# Patient Record
Sex: FEMALE | Race: WHITE | Employment: UNEMPLOYED | URBAN - METROPOLITAN AREA
[De-identification: names, ages, dates, MRNs, and addresses within clinical notes are randomized per-mention and may not be internally consistent; named-entity substitution may affect disease eponyms.]

---

## 2019-04-05 PROBLEM — R49.0 HOARSENESS OF VOICE: Status: ACTIVE | Noted: 2019-04-05

## 2019-04-05 PROBLEM — J45.30 MILD PERSISTENT ASTHMA: Status: ACTIVE | Noted: 2019-04-05

## 2020-02-13 PROBLEM — R49.0 HOARSENESS OF VOICE: Status: RESOLVED | Noted: 2019-04-05 | Resolved: 2020-02-13

## 2020-02-13 PROBLEM — J43.2 CENTRILOBULAR EMPHYSEMA (HCC): Status: ACTIVE | Noted: 2020-02-13

## 2020-06-17 PROBLEM — M75.82 ROTATOR CUFF TENDINITIS, LEFT: Status: ACTIVE | Noted: 2020-06-17

## 2020-06-17 PROBLEM — M75.81 ROTATOR CUFF TENDINITIS, RIGHT: Status: ACTIVE | Noted: 2020-06-17

## 2020-12-10 PROBLEM — J45.20 MILD INTERMITTENT ASTHMA: Status: ACTIVE | Noted: 2019-04-05

## 2022-06-13 PROBLEM — N18.30 CHRONIC RENAL DISEASE, STAGE III (HCC): Status: ACTIVE | Noted: 2022-06-13

## 2022-09-29 RX ORDER — SIMVASTATIN 40 MG
TABLET ORAL
Qty: 90 TABLET | Refills: 1 | Status: SHIPPED | OUTPATIENT
Start: 2022-09-29 | End: 2022-11-03 | Stop reason: SDUPTHER

## 2022-09-29 RX ORDER — PROPRANOLOL HYDROCHLORIDE 20 MG/1
TABLET ORAL
Qty: 180 TABLET | Refills: 1 | Status: SHIPPED | OUTPATIENT
Start: 2022-09-29

## 2022-10-04 DIAGNOSIS — E11.9 TYPE 2 DIABETES MELLITUS WITHOUT COMPLICATION, WITHOUT LONG-TERM CURRENT USE OF INSULIN (HCC): Chronic | ICD-10-CM

## 2022-10-04 LAB
A/G RATIO: 1.4 (ref 1.1–2.2)
ALBUMIN SERPL-MCNC: 4 G/DL (ref 3.4–5)
ALP BLD-CCNC: 91 U/L (ref 40–129)
ALT SERPL-CCNC: 17 U/L (ref 10–40)
ANION GAP SERPL CALCULATED.3IONS-SCNC: 16 MMOL/L (ref 3–16)
AST SERPL-CCNC: 13 U/L (ref 15–37)
BILIRUB SERPL-MCNC: 0.3 MG/DL (ref 0–1)
BUN BLDV-MCNC: 20 MG/DL (ref 7–20)
CALCIUM SERPL-MCNC: 10 MG/DL (ref 8.3–10.6)
CHLORIDE BLD-SCNC: 99 MMOL/L (ref 99–110)
CHOLESTEROL, TOTAL: 189 MG/DL (ref 0–199)
CO2: 23 MMOL/L (ref 21–32)
CREAT SERPL-MCNC: 1 MG/DL (ref 0.6–1.2)
GFR AFRICAN AMERICAN: >60
GFR NON-AFRICAN AMERICAN: 56
GLUCOSE BLD-MCNC: 240 MG/DL (ref 70–99)
HDLC SERPL-MCNC: 40 MG/DL (ref 40–60)
LDL CHOLESTEROL CALCULATED: ABNORMAL MG/DL
LDL CHOLESTEROL DIRECT: 78 MG/DL
POTASSIUM SERPL-SCNC: 4.8 MMOL/L (ref 3.5–5.1)
SODIUM BLD-SCNC: 138 MMOL/L (ref 136–145)
TOTAL PROTEIN: 6.9 G/DL (ref 6.4–8.2)
TRIGL SERPL-MCNC: 503 MG/DL (ref 0–150)
VLDLC SERPL CALC-MCNC: ABNORMAL MG/DL

## 2022-10-05 LAB
ESTIMATED AVERAGE GLUCOSE: 205.9 MG/DL
HBA1C MFR BLD: 8.8 %

## 2022-10-19 DIAGNOSIS — R05.3 CHRONIC COUGH: ICD-10-CM

## 2022-10-20 RX ORDER — FAMOTIDINE 20 MG/1
TABLET, FILM COATED ORAL
Qty: 180 TABLET | Refills: 0 | Status: SHIPPED | OUTPATIENT
Start: 2022-10-20

## 2022-10-28 ENCOUNTER — HOSPITAL ENCOUNTER (OUTPATIENT)
Dept: WOMENS IMAGING | Age: 62
Discharge: HOME | End: 2022-10-28
Payer: MEDICARE

## 2022-10-28 ENCOUNTER — APPOINTMENT (OUTPATIENT)
Dept: ULTRASOUND IMAGING | Age: 62
End: 2022-10-28
Payer: MEDICARE

## 2022-10-28 DIAGNOSIS — R92.8 ABNORMAL MAMMOGRAM: ICD-10-CM

## 2022-10-28 PROCEDURE — 77065 DX MAMMO INCL CAD UNI: CPT

## 2022-10-31 ENCOUNTER — OFFICE VISIT (OUTPATIENT)
Dept: FAMILY MEDICINE CLINIC | Age: 62
End: 2022-10-31
Payer: MEDICARE

## 2022-10-31 VITALS
HEIGHT: 67 IN | DIASTOLIC BLOOD PRESSURE: 80 MMHG | TEMPERATURE: 97 F | SYSTOLIC BLOOD PRESSURE: 124 MMHG | WEIGHT: 224 LBS | HEART RATE: 72 BPM | BODY MASS INDEX: 35.16 KG/M2

## 2022-10-31 DIAGNOSIS — E11.9 TYPE 2 DIABETES MELLITUS WITHOUT COMPLICATION, WITHOUT LONG-TERM CURRENT USE OF INSULIN (HCC): Chronic | ICD-10-CM

## 2022-10-31 DIAGNOSIS — E78.2 MIXED HYPERLIPIDEMIA: Primary | Chronic | ICD-10-CM

## 2022-10-31 PROCEDURE — 1036F TOBACCO NON-USER: CPT | Performed by: FAMILY MEDICINE

## 2022-10-31 PROCEDURE — G8417 CALC BMI ABV UP PARAM F/U: HCPCS | Performed by: FAMILY MEDICINE

## 2022-10-31 PROCEDURE — 3017F COLORECTAL CA SCREEN DOC REV: CPT | Performed by: FAMILY MEDICINE

## 2022-10-31 PROCEDURE — G8427 DOCREV CUR MEDS BY ELIG CLIN: HCPCS | Performed by: FAMILY MEDICINE

## 2022-10-31 PROCEDURE — G8484 FLU IMMUNIZE NO ADMIN: HCPCS | Performed by: FAMILY MEDICINE

## 2022-10-31 PROCEDURE — 3052F HG A1C>EQUAL 8.0%<EQUAL 9.0%: CPT | Performed by: FAMILY MEDICINE

## 2022-10-31 PROCEDURE — 2022F DILAT RTA XM EVC RTNOPTHY: CPT | Performed by: FAMILY MEDICINE

## 2022-10-31 PROCEDURE — 99213 OFFICE O/P EST LOW 20 MIN: CPT | Performed by: FAMILY MEDICINE

## 2022-10-31 SDOH — ECONOMIC STABILITY: TRANSPORTATION INSECURITY
IN THE PAST 12 MONTHS, HAS LACK OF TRANSPORTATION KEPT YOU FROM MEETINGS, WORK, OR FROM GETTING THINGS NEEDED FOR DAILY LIVING?: NO

## 2022-10-31 SDOH — ECONOMIC STABILITY: FOOD INSECURITY: WITHIN THE PAST 12 MONTHS, THE FOOD YOU BOUGHT JUST DIDN'T LAST AND YOU DIDN'T HAVE MONEY TO GET MORE.: NEVER TRUE

## 2022-10-31 SDOH — ECONOMIC STABILITY: TRANSPORTATION INSECURITY
IN THE PAST 12 MONTHS, HAS THE LACK OF TRANSPORTATION KEPT YOU FROM MEDICAL APPOINTMENTS OR FROM GETTING MEDICATIONS?: NO

## 2022-10-31 SDOH — ECONOMIC STABILITY: FOOD INSECURITY: WITHIN THE PAST 12 MONTHS, YOU WORRIED THAT YOUR FOOD WOULD RUN OUT BEFORE YOU GOT MONEY TO BUY MORE.: NEVER TRUE

## 2022-10-31 ASSESSMENT — PATIENT HEALTH QUESTIONNAIRE - PHQ9
SUM OF ALL RESPONSES TO PHQ QUESTIONS 1-9: 0
2. FEELING DOWN, DEPRESSED OR HOPELESS: 0
1. LITTLE INTEREST OR PLEASURE IN DOING THINGS: 0
SUM OF ALL RESPONSES TO PHQ QUESTIONS 1-9: 0
SUM OF ALL RESPONSES TO PHQ QUESTIONS 1-9: 0
SUM OF ALL RESPONSES TO PHQ9 QUESTIONS 1 & 2: 0
SUM OF ALL RESPONSES TO PHQ QUESTIONS 1-9: 0

## 2022-10-31 ASSESSMENT — ENCOUNTER SYMPTOMS
VOMITING: 0
NAUSEA: 0
BLOOD IN STOOL: 0
ABDOMINAL PAIN: 0
DIARRHEA: 0
SHORTNESS OF BREATH: 0

## 2022-10-31 ASSESSMENT — SOCIAL DETERMINANTS OF HEALTH (SDOH): HOW HARD IS IT FOR YOU TO PAY FOR THE VERY BASICS LIKE FOOD, HOUSING, MEDICAL CARE, AND HEATING?: NOT HARD AT ALL

## 2022-10-31 NOTE — PROGRESS NOTES
Subjective:      Patient ID: Tanner Puente is a 60 y.o. female. Chief Complaint   Patient presents with    Follow-up     Diabetes, lipids - discuss lab results        Patient presents with: Follow-up: Diabetes, lipids - discuss lab results    Here for the above  She is well  She is still seeing the endocrine     She tells me she is feeling well     YOB: 1960    Date of Visit:  10/31/2022     -- Ampicillin -- Rash, Hives, Shortness Of Breath                            and Swelling    --  Tongue and throat swelling, thinks hear and lungs             affected. -- Doxycycline -- Hives, Shortness Of Breath and                            Swelling    --  Tongue and throat swelling, thinks hear and lungs             affected.    -- Bee Venom -- Swelling   -- Cholestatin -- Other (See Comments)    --  Dust mites   -- Doxycycline Calcium    -- Doxycycline Monohydrate -- Swelling   -- Vibramycin [Doxycycline Calcium]    -- Cefuroxime Axetil -- Rash, Hives and Swelling    --  burning    Current Outpatient Medications:  metFORMIN (GLUCOPHAGE) 500 MG tablet, Take 2 tablets by mouth 2 times daily (with meals), Disp: 360 tablet, Rfl: 1  insulin glargine (LANTUS SOLOSTAR) 100 UNIT/ML injection pen, ADMINISTER 20 UNITS UNDER THE SKIN EVERY NIGHT AT BEDTIME, Disp: 18 mL, Rfl: 0  sertraline (ZOLOFT) 50 MG tablet, TAKE 1 TABLET BY MOUTH EVERY DAY, Disp: 90 tablet, Rfl: 0  famotidine (PEPCID) 20 MG tablet, TAKE 1 TABLET BY MOUTH TWICE DAILY, Disp: 180 tablet, Rfl: 0  simvastatin (ZOCOR) 40 MG tablet, TAKE ONE TABLET BY MOUTH EVERY EVENING, Disp: 90 tablet, Rfl: 1  propranolol (INDERAL) 20 MG tablet, TAKE ONE TABLET BY MOUTH TWICE A DAY, Disp: 180 tablet, Rfl: 1  tiotropium-olodaterol (STIOLTO RESPIMAT) 2.5-2.5 MCG/ACT AERS, Inhale 2 puffs into the lungs daily, Disp: 1 each, Rfl: 11  azelastine (ASTELIN) 0.1 % nasal spray, SPRAY 2 SPRAYS IN EACH NOSTRIL DAILY AS DIRECTED, Disp: 30 mL, Rfl: 3  albuterol sulfate HFA (PROAIR HFA) 108 (90 Base) MCG/ACT inhaler, Inhale 2 puffs into the lungs every 6 hours as needed for Wheezing or Shortness of Breath, Disp: 18 g, Rfl: 11  blood glucose monitor strips, Test one time a day, Disp: 50 strip, Rfl: 5  lisinopril (PRINIVIL;ZESTRIL) 10 MG tablet, Take 1 tablet by mouth in the morning., Disp: 30 tablet, Rfl: 2  empagliflozin (JARDIANCE) 10 MG tablet, Take 1 tablet by mouth in the morning., Disp: 90 tablet, Rfl: 1  Insulin Pen Needle (B-D UF III MINI PEN NEEDLES) 31G X 5 MM MISC, 1 each by Does not apply route daily, Disp: 100 each, Rfl: 6  vitamin B-12 (CYANOCOBALAMIN) 1000 MCG tablet, Take 1,000 mcg by mouth in the morning., Disp: , Rfl:   glipiZIDE (GLUCOTROL) 5 mg tablet, TAKE TWO TABLETS BY MOUTH TWICE A DAY BEFORE MEALS, Disp: 360 tablet, Rfl: 1  fluticasone (FLONASE) 50 MCG/ACT nasal spray, SPRAY ONE SPRAY IN EACH NOSTRIL TWICE DAILY, Disp: 1 each, Rfl: 11  albuterol sulfate HFA (VENTOLIN HFA) 108 (90 Base) MCG/ACT inhaler, INHALE TWO PUFFS BY MOUTH FOUR TIMES A DAY AS NEEDED FOR WHEEZING OR COUGH, Disp: 18 g, Rfl: 11  Misc.  Devices MISC, Donut Cushion, Disp: 1 Device, Rfl: 0  Spacer/Aero-Holding Chambers (AEROCHAMBER PLUS SPENSER-VU) MISC, USE WITH INHALER FOUR TIMES A DAY AS NEEDED, Disp: 1 each, Rfl: 0  blood glucose monitor kit and supplies, Test one times a day & as needed for symptoms of irregular blood glucose., Disp: 1 kit, Rfl: 0  omeprazole (PRILOSEC) 20 MG delayed release capsule, Take 1 capsule by mouth 2 times daily (before meals) (Patient taking differently: Take 20 mg by mouth Daily), Disp: 180 capsule, Rfl: 1  vitamin D (CHOLECALCIFEROL) 1000 UNIT TABS tablet, Take 1,000 Units by mouth daily, Disp: , Rfl:     No current facility-administered medications for this visit.      ---------------------------               10/31/22                      1458         ---------------------------   BP:          124/80         Site:    Left Upper Arm     Position: Sitting        Cuff Size:   Large Adult      Pulse:         72           Temp:    97 °F (36.1 °C)    TempSrc:    Temporal        Weight: 224 lb (101.6 kg)   Height:  5' 7\" (1.702 m)   ---------------------------  Body mass index is 35.08 kg/m². Wt Readings from Last 3 Encounters:  10/31/22 : 224 lb (101.6 kg)  09/23/22 : 227 lb (103 kg)  09/19/22 : 225 lb (102.1 kg)    BP Readings from Last 3 Encounters:  10/31/22 : 124/80  09/23/22 : 137/83  09/19/22 : 119/81              Review of Systems   Constitutional:  Negative for chills and fever. Respiratory:  Negative for shortness of breath. Cardiovascular:  Negative for chest pain, palpitations and leg swelling. Gastrointestinal:  Negative for abdominal pain, blood in stool, diarrhea, nausea and vomiting. Genitourinary:  Negative for difficulty urinating, dysuria and hematuria. Musculoskeletal:         Feet ok no sore and she does check    Neurological:  Negative for headaches. Objective:   Physical Exam  Constitutional:       General: She is not in acute distress. Appearance: Normal appearance. She is well-developed. She is not ill-appearing or diaphoretic. Neck:      Thyroid: No thyroid mass or thyromegaly. Cardiovascular:      Rate and Rhythm: Normal rate and regular rhythm. Heart sounds: Normal heart sounds. No murmur heard. No friction rub. No gallop. Comments:     Pulmonary:      Effort: Pulmonary effort is normal. No tachypnea, accessory muscle usage or respiratory distress. Breath sounds: Normal breath sounds. No decreased breath sounds, wheezing, rhonchi or rales. Abdominal:      General: Bowel sounds are normal. There is no distension or abdominal bruit. Palpations: Abdomen is soft. There is no hepatomegaly, splenomegaly, mass or pulsatile mass. Tenderness: There is no abdominal tenderness. There is no guarding. Musculoskeletal:      Cervical back: Neck supple.    Lymphadenopathy: Cervical: No cervical adenopathy. Upper Body:      Right upper body: No supraclavicular adenopathy. Left upper body: No supraclavicular adenopathy. Skin:     General: Skin is warm and dry. Coloration: Skin is not pale. Nails: There is no clubbing. Neurological:      Mental Status: She is alert. Assessment:       Diagnosis Orders   1. Mixed hyperlipidemia        2.  Type 2 diabetes mellitus without complication, without long-term current use of insulin ([address]            Discussed her renal health   She is seeing gi dr Balwinder Mccauley in a few week for egd and colon   Interval Hx:    Repeat mammogram was fine on 10/28/22  Speech therapy visit for the cough and swallowing on 10/21/22  Ent visit on 9/23/22  Lung doctor visit on 9/8/22 for the cough       Plan:      Continue careful diet  Stay with the medicines  See dr Kayla Sahni for the diabetes  See us in about 5 months         Ben Polo MD

## 2022-10-31 NOTE — PATIENT INSTRUCTIONS
Continue careful diet  Stay with the medicines  See dr Collette Brocks for the diabetes  See us in about 5 months

## 2022-11-02 ENCOUNTER — OFFICE VISIT (OUTPATIENT)
Dept: ENDOCRINOLOGY | Age: 62
End: 2022-11-02
Payer: MEDICARE

## 2022-11-02 VITALS
DIASTOLIC BLOOD PRESSURE: 80 MMHG | RESPIRATION RATE: 14 BRPM | HEART RATE: 96 BPM | TEMPERATURE: 98 F | HEIGHT: 67 IN | BODY MASS INDEX: 35.63 KG/M2 | WEIGHT: 227 LBS | SYSTOLIC BLOOD PRESSURE: 132 MMHG

## 2022-11-02 DIAGNOSIS — E66.01 SEVERE OBESITY (BMI 35.0-39.9) WITH COMORBIDITY (HCC): ICD-10-CM

## 2022-11-02 DIAGNOSIS — E11.65 UNCONTROLLED TYPE 2 DIABETES MELLITUS WITH HYPERGLYCEMIA (HCC): ICD-10-CM

## 2022-11-02 PROCEDURE — G8484 FLU IMMUNIZE NO ADMIN: HCPCS | Performed by: INTERNAL MEDICINE

## 2022-11-02 PROCEDURE — G8427 DOCREV CUR MEDS BY ELIG CLIN: HCPCS | Performed by: INTERNAL MEDICINE

## 2022-11-02 PROCEDURE — 2022F DILAT RTA XM EVC RTNOPTHY: CPT | Performed by: INTERNAL MEDICINE

## 2022-11-02 PROCEDURE — 3017F COLORECTAL CA SCREEN DOC REV: CPT | Performed by: INTERNAL MEDICINE

## 2022-11-02 PROCEDURE — 1036F TOBACCO NON-USER: CPT | Performed by: INTERNAL MEDICINE

## 2022-11-02 PROCEDURE — 99214 OFFICE O/P EST MOD 30 MIN: CPT | Performed by: INTERNAL MEDICINE

## 2022-11-02 PROCEDURE — G8417 CALC BMI ABV UP PARAM F/U: HCPCS | Performed by: INTERNAL MEDICINE

## 2022-11-02 PROCEDURE — 3052F HG A1C>EQUAL 8.0%<EQUAL 9.0%: CPT | Performed by: INTERNAL MEDICINE

## 2022-11-02 RX ORDER — INSULIN GLARGINE 100 [IU]/ML
INJECTION, SOLUTION SUBCUTANEOUS
Qty: 15 ML | Refills: 4 | Status: SHIPPED | OUTPATIENT
Start: 2022-11-02

## 2022-11-02 RX ORDER — DULAGLUTIDE 1.5 MG/.5ML
1.5 INJECTION, SOLUTION SUBCUTANEOUS
COMMUNITY
End: 2022-11-02

## 2022-11-02 NOTE — PROGRESS NOTES
Seen as patient for diabetes    Interim:      Diagnosed with Type 2 diabetes mellitus > 10 years    Known diabetic complications: Retinopathy  Uncontrolled, moderate    Current diabetic medications     Jardiance 10mg  Metformin 1gm BID  Glipizide 10mg BID  Lantus  20 units      Trulicity 9.4NU stopped due to side effects    H/o [place] and [place]    Last A1c  8.8%<------8.4%<-----9.4%<------ 8.9<--- 9.6<---- 9.3    Prior visit with dietician: no  Current diet: on average, 3 meals per day   Current exercise: walking   Current monitoring regimen: home blood tests -  1/day    Has brought blood glucose log/meter: No   Home blood sugar records:131-188  Any episodes of hypoglycemia? Worsened by high CHO    No Hx of CAD , PVD, CVA    Hyperlipidemia:   For   Years  Takes simvastatin 40mg  Controlled  LDL 57   on 8/20    Last eye exam: 2/22  Last foot exam: 7/22  Last microalbumin to creatinine ratio: 8/19---> 145 On 7/22 had cough with lisinopril    Does report nausea, vomiting after eating  For 3-4 months  Sees GI  Will be having EGD  She was on trulicity since 7/76    She had episode of vomiting in the office    Past Medical History:   Diagnosis Date    Acid reflux     Cancer ([facility] [address]     SKIN    Diabetes mellitus ([facility] [address]     Hyperlipidemia     Irregular heart beat     Pre-diabetes      Past Surgical History:   Procedure Laterality Date    COLONOSCOPY  12/29/2008    normal dr Layvonne Runner, check in 7-10 years    COLONOSCOPY  05/31/2017    dr Layvonne Runner polyp repeat in 5 years    FRACTURE SURGERY Bilateral     legs, pins in legs removed.     HYSTERECTOMY (CERVIX STATUS UNKNOWN)  2003    complete    PAIN MANAGEMENT PROCEDURE Right 01/06/2020    RIGHT L4 AND L5 TRANSFORAMINAL EPIDURAL STEROID INJECTION WITH FLUOROSCOPY (22052, 98312) performed by Niki Ibarra MD at [place] [address]  01/30/2004    dr Layvonne Runner    UPPER GASTROINTESTINAL ENDOSCOPY  12/05/2014    gastric ulcers, dr Leah Uribe GASTROINTESTINAL ENDOSCOPY  05/31/2017    gastritis no ulcers dr Jimenez Pineda    WRIST ARTHROPLASTY       Current Outpatient Medications   Medication Sig Dispense Refill    metFORMIN (GLUCOPHAGE) 500 MG tablet Take 2 tablets by mouth 2 times daily (with meals) 360 tablet 1    insulin glargine (LANTUS SOLOSTAR) 100 UNIT/ML injection pen ADMINISTER 20 UNITS UNDER THE SKIN EVERY NIGHT AT BEDTIME 18 mL 0    sertraline (ZOLOFT) 50 MG tablet TAKE 1 TABLET BY MOUTH EVERY DAY 90 tablet 0    famotidine (PEPCID) 20 MG tablet TAKE 1 TABLET BY MOUTH TWICE DAILY 180 tablet 0    simvastatin (ZOCOR) 40 MG tablet TAKE ONE TABLET BY MOUTH EVERY EVENING 90 tablet 1    propranolol (INDERAL) 20 MG tablet TAKE ONE TABLET BY MOUTH TWICE A  tablet 1    tiotropium-olodaterol (STIOLTO RESPIMAT) 2.5-2.5 MCG/ACT AERS Inhale 2 puffs into the lungs daily 1 each 11    azelastine (ASTELIN) 0.1 % nasal spray SPRAY 2 SPRAYS IN EACH NOSTRIL DAILY AS DIRECTED 30 mL 3    albuterol sulfate HFA (PROAIR HFA) 108 (90 Base) MCG/ACT inhaler Inhale 2 puffs into the lungs every 6 hours as needed for Wheezing or Shortness of Breath 18 g 11    blood glucose monitor strips Test one time a day 50 strip 5    lisinopril (PRINIVIL;ZESTRIL) 10 MG tablet Take 1 tablet by mouth in the morning. 30 tablet 2    empagliflozin (JARDIANCE) 10 MG tablet Take 1 tablet by mouth in the morning. 90 tablet 1    Insulin Pen Needle (B-D UF III MINI PEN NEEDLES) 31G X 5 MM MISC 1 each by Does not apply route daily 100 each 6    vitamin B-12 (CYANOCOBALAMIN) 1000 MCG tablet Take 1,000 mcg by mouth in the morning. glipiZIDE (GLUCOTROL) 5 mg tablet TAKE TWO TABLETS BY MOUTH TWICE A DAY BEFORE MEALS 360 tablet 1    fluticasone (FLONASE) 50 MCG/ACT nasal spray SPRAY ONE SPRAY IN EACH NOSTRIL TWICE DAILY 1 each 11    albuterol sulfate HFA (VENTOLIN HFA) 108 (90 Base) MCG/ACT inhaler INHALE TWO PUFFS BY MOUTH FOUR TIMES A DAY AS NEEDED FOR WHEEZING OR COUGH 18 g 11    Misc.  Devices MISC Donut Cushion 1 Device 0    Spacer/Aero-Holding Chambers (AEROCHAMBER PLUS SPENSER-VU) MISC USE WITH INHALER FOUR TIMES A DAY AS NEEDED 1 each 0    blood glucose monitor kit and supplies Test one times a day & as needed for symptoms of irregular blood glucose. 1 kit 0    omeprazole (PRILOSEC) 20 MG delayed release capsule Take 1 capsule by mouth 2 times daily (before meals) (Patient taking differently: Take 20 mg by mouth Daily) 180 capsule 1    vitamin D (CHOLECALCIFEROL) 1000 UNIT TABS tablet Take 1,000 Units by mouth daily      dulaglutide (TRULICITY) 1.5 WW/9.0AO SC injection Inject 1.5 mg into the skin every 7 days (Patient not taking: Reported on 11/2/2022)       No current facility-administered medications for this visit. Review of Systems  Please see scanned document dated and signed      Objective:      /80   Pulse 96   Temp 98 °F (36.7 °C)   Resp 14   Ht 5' 7\" (1.702 m)   Wt 227 lb (103 kg)   BMI 35.55 kg/m²   Wt Readings from Last 3 Encounters:   11/02/22 227 lb (103 kg)   10/31/22 224 lb (101.6 kg)   09/23/22 227 lb (103 kg)     Constitutional: Well-developed, alert, appears stated age, cooperative, in no acute distress  H/E/N/M/T:atraumatic, normocephalic, external ears, nose, lips normal without lesions  No facial puffiness, no hoarseness    Eyes: Arcus Senilis is not present, extraocular muscles are intact  Neck: supple, trachea midline, acanthosis nigricance is not present. Thyroid: gland size is normal, non-tender on palpation  Respiratory: breathing is unlabored, lungs are clear to auscultations. Cardiovascular: regular rate and rhythm, S1, S2, regular rate and rhythm, no murmur, rub or gallop.    Skeletal muscular: no kyphosis, no gross abnormalities  Skin: Xanthoma/Xanthelasmas are  not present  Psychiatric: Judgement and Insight:  judgement and insight appear normal  Neuro: Normal without focal findings, speech is spontaneous, and movements are coordinated, alert and oriented x3   Skeletal foot exam is normal, no skin lesions, toenails are normal, 10 g monofilament is detected  Vibration reduced  Bunion deformity    Lab Reviewed   No components found for: CHLPL  Lab Results   Component Value Date    TRIG 503 (H) 10/04/2022    TRIG 286 (H) 08/18/2020    TRIG 304 (H) 08/19/2019     Lab Results   Component Value Date    HDL 40 10/04/2022    HDL 38 (L) 08/18/2020    HDL 40 08/19/2019     Lab Results   Component Value Date    LDLCALC see below 10/04/2022    [address] 08/18/2020    [address] see below 08/19/2019     Lab Results   Component Value Date    LABVLDL see below 10/04/2022    LABVLDL 57 08/18/2020    LABVLDL see below 08/19/2019     Lab Results   Component Value Date    LABA1C 8.8 10/04/2022       Assessment:     Regan Gaitan is a 60 y.o. female with :    1.T2DM: Longstanding, uncontrolled. Discussed goals, risk of complications. She had  GI symptoms, stopped trulicity. She would like to avoid insulin. Given high A1c, recommend basal insulin. Added SGLT-2 inhibitor. Will assess next visit if trulicity can be added back as will have GI work up. Discussed if glucose improves and loses weight, can decrease insulin dose. Advise glucose monitoring  Check glucose 2 hour after meals    2. HLD:  LDL at goal, TG elevated, on statin    3. Microalbuminuria: Did not tolerate lisinopril    Plan:      Lantus 24 units daily, advised self titration   Jardiance 10mg daily   Metformin and glyburide   Advised to check blood sugar 1   times a day   Advise to low simple carbohydrate and protein with each  meal diet. Diabetes Care: recommend yearly eye exam, foot exam and urine microalbumin to   creatinine ratio. Patient is up-to-date.

## 2022-11-03 DIAGNOSIS — J43.2 CENTRILOBULAR EMPHYSEMA (HCC): ICD-10-CM

## 2022-11-03 RX ORDER — SIMVASTATIN 40 MG
40 TABLET ORAL NIGHTLY
Qty: 90 TABLET | Refills: 1 | Status: SHIPPED | OUTPATIENT
Start: 2022-11-03

## 2022-11-07 DIAGNOSIS — E11.65 UNCONTROLLED TYPE 2 DIABETES MELLITUS WITH HYPERGLYCEMIA (HCC): Primary | ICD-10-CM

## 2022-11-07 RX ORDER — GLUCOSAMINE HCL/CHONDROITIN SU 500-400 MG
CAPSULE ORAL
Qty: 50 STRIP | Refills: 5 | Status: SHIPPED | OUTPATIENT
Start: 2022-11-07

## 2022-11-07 NOTE — TELEPHONE ENCOUNTER
Medication:   Requested Prescriptions     Pending Prescriptions Disp Refills    blood glucose monitor strips 50 strip 5     Sig: Test one time a day       Last Filled:      Patient Phone Number: [phone] (home) [phone] (work)    Last appt: 11/2/2022   Next appt: Visit date not found    Last Labs DM:   Lab Results   Component Value Date/Time    LABA1C 8.8 10/04/2022 10:57 AM

## 2022-11-08 ENCOUNTER — TELEPHONE (OUTPATIENT)
Dept: ENDOCRINOLOGY | Age: 62
End: 2022-11-08

## 2022-11-08 DIAGNOSIS — E11.9 TYPE 2 DIABETES MELLITUS WITHOUT COMPLICATION, WITHOUT LONG-TERM CURRENT USE OF INSULIN (HCC): Primary | ICD-10-CM

## 2022-11-08 RX ORDER — BLOOD-GLUCOSE METER
KIT MISCELLANEOUS
Qty: 1 KIT | Refills: 0 | Status: SHIPPED | OUTPATIENT
Start: 2022-11-08

## 2022-11-08 NOTE — TELEPHONE ENCOUNTER
Patient needs an order sent to her pharmacy for an Accucheck meter.           [facility] [address], [address] [place] [address] Jorge Vera [phone]   [address] [address]   Phone:  [phone]  Fax:  [phone]

## 2022-11-09 NOTE — PROGRESS NOTES
[facility] [place]OSCOPY COLONOSCOPY PRE-OPERATIVE INSTRUCTIONS    Procedure date__11/15/2022_______  Arrival time____1230________          Surgery time___1330_________       Clear liquids the day before the procedure. Do not eat or drink anything within 5 hours of your procedure. This includes water chewing gum, mints and ice chips. You may brush your teeth and gargle the morning of your surgery, but do not swallow the water    You may be asked to stop blood thinners such as Coumadin, Plavix, Fragmin, Lovenox, etc., or any anti-inflammatories such as:  Aspirin, Ibuprofen, Advil, Naproxen prior to your procedure. We also ask that you stop any OTC medications such as fish oil, vitamin E, glucosamine, garlic, Multivitamins, COQ 10, etc.    You must make arrangements for a responsible adult to arrive with you and stay in our waiting area during your procedure. They will also need to take you home after your procedure. For your safety you will not be allowed to leave alone or drive yourself home. Also for your safety, it is strongly suggested that someone stay with you the first 24 hours after your procedure. For your comfort, please wear simple loose fitting clothing to the center. Please do not bring valuables. If you have a living will and a durable power of  for healthcare, please bring in a copy.      You will need to bring a photo ID and insurance card    Our goal is to provide you with excellent care so if you have any questions, please contact us at the [facility] at [phone]         Please note these are generalized instructions for all colonoscopy cases, you may be provided with more specific instructions if necessary

## 2022-11-14 ENCOUNTER — TELEPHONE (OUTPATIENT)
Dept: ENDOCRINOLOGY | Age: 62
End: 2022-11-14

## 2022-11-14 ENCOUNTER — ANESTHESIA EVENT (OUTPATIENT)
Dept: ENDOSCOPY | Age: 62
End: 2022-11-14
Payer: MEDICARE

## 2022-11-14 NOTE — TELEPHONE ENCOUNTER
Call from patient stating that she is scheduled to have a colonoscopy and endoscopy procedure tomorrow 11/15/22  Arrival time is 12:30 pm. Procedure starts at 1:30 pm    Patient stated that the physician that is performing the procedure is wanting to know if she should take her normal dose of insulin tonight at 6 pm?    Patient is requesting a call back     Please advise   CB# [phone]

## 2022-11-15 ENCOUNTER — HOSPITAL ENCOUNTER (OUTPATIENT)
Age: 62
Setting detail: OUTPATIENT SURGERY
Discharge: HOME | End: 2022-11-15
Attending: INTERNAL MEDICINE | Admitting: INTERNAL MEDICINE
Payer: MEDICARE

## 2022-11-15 ENCOUNTER — ANESTHESIA (OUTPATIENT)
Dept: ENDOSCOPY | Age: 62
End: 2022-11-15
Payer: MEDICARE

## 2022-11-15 VITALS
SYSTOLIC BLOOD PRESSURE: 130 MMHG | DIASTOLIC BLOOD PRESSURE: 66 MMHG | RESPIRATION RATE: 16 BRPM | OXYGEN SATURATION: 97 % | HEART RATE: 86 BPM | HEIGHT: 67 IN | WEIGHT: 225 LBS | BODY MASS INDEX: 35.31 KG/M2 | TEMPERATURE: 97 F

## 2022-11-15 DIAGNOSIS — Z12.11 SCREEN FOR COLON CANCER: ICD-10-CM

## 2022-11-15 DIAGNOSIS — R05.3 CHRONIC COUGH: ICD-10-CM

## 2022-11-15 LAB
GLUCOSE BLD-MCNC: 214 MG/DL (ref 70–99)
PERFORMED ON: ABNORMAL

## 2022-11-15 PROCEDURE — 88342 IMHCHEM/IMCYTCHM 1ST ANTB: CPT

## 2022-11-15 PROCEDURE — 2580000003 HC RX 258: Performed by: STUDENT IN AN ORGANIZED HEALTH CARE EDUCATION/TRAINING PROGRAM

## 2022-11-15 PROCEDURE — 88305 TISSUE EXAM BY PATHOLOGIST: CPT

## 2022-11-15 PROCEDURE — 3609010600 HC COLONOSCOPY POLYPECTOMY SNARE/COLD BIOPSY: Performed by: INTERNAL MEDICINE

## 2022-11-15 PROCEDURE — 7100000010 HC PHASE II RECOVERY - FIRST 15 MIN: Performed by: INTERNAL MEDICINE

## 2022-11-15 PROCEDURE — 7100000011 HC PHASE II RECOVERY - ADDTL 15 MIN: Performed by: INTERNAL MEDICINE

## 2022-11-15 PROCEDURE — 3609012400 HC EGD TRANSORAL BIOPSY SINGLE/MULTIPLE: Performed by: INTERNAL MEDICINE

## 2022-11-15 PROCEDURE — 3609015300 HC ESOPHAGEAL DILATION MALONEY: Performed by: INTERNAL MEDICINE

## 2022-11-15 PROCEDURE — 2709999900 HC NON-CHARGEABLE SUPPLY: Performed by: INTERNAL MEDICINE

## 2022-11-15 PROCEDURE — 2580000003 HC RX 258: Performed by: NURSE ANESTHETIST, CERTIFIED REGISTERED

## 2022-11-15 PROCEDURE — 3700000000 HC ANESTHESIA ATTENDED CARE: Performed by: INTERNAL MEDICINE

## 2022-11-15 PROCEDURE — 2500000003 HC RX 250 WO HCPCS: Performed by: NURSE ANESTHETIST, CERTIFIED REGISTERED

## 2022-11-15 PROCEDURE — 3700000001 HC ADD 15 MINUTES (ANESTHESIA): Performed by: INTERNAL MEDICINE

## 2022-11-15 PROCEDURE — 6360000002 HC RX W HCPCS: Performed by: NURSE ANESTHETIST, CERTIFIED REGISTERED

## 2022-11-15 RX ORDER — PROPOFOL 10 MG/ML
INJECTION, EMULSION INTRAVENOUS PRN
Status: DISCONTINUED | OUTPATIENT
Start: 2022-11-15 | End: 2022-11-15 | Stop reason: SDUPTHER

## 2022-11-15 RX ORDER — SODIUM CHLORIDE 9 MG/ML
INJECTION, SOLUTION INTRAVENOUS CONTINUOUS
Status: DISCONTINUED | OUTPATIENT
Start: 2022-11-15 | End: 2022-11-15 | Stop reason: HOSPADM

## 2022-11-15 RX ORDER — SODIUM CHLORIDE 0.9 % (FLUSH) 0.9 %
5-40 SYRINGE (ML) INJECTION PRN
Status: DISCONTINUED | OUTPATIENT
Start: 2022-11-15 | End: 2022-11-15 | Stop reason: HOSPADM

## 2022-11-15 RX ORDER — SODIUM CHLORIDE 9 MG/ML
INJECTION, SOLUTION INTRAVENOUS PRN
Status: DISCONTINUED | OUTPATIENT
Start: 2022-11-15 | End: 2022-11-15 | Stop reason: HOSPADM

## 2022-11-15 RX ORDER — SODIUM CHLORIDE 9 MG/ML
INJECTION, SOLUTION INTRAVENOUS CONTINUOUS PRN
Status: DISCONTINUED | OUTPATIENT
Start: 2022-11-15 | End: 2022-11-15 | Stop reason: SDUPTHER

## 2022-11-15 RX ORDER — GLYCOPYRROLATE 0.2 MG/ML
INJECTION INTRAMUSCULAR; INTRAVENOUS PRN
Status: DISCONTINUED | OUTPATIENT
Start: 2022-11-15 | End: 2022-11-15 | Stop reason: SDUPTHER

## 2022-11-15 RX ORDER — SODIUM CHLORIDE 0.9 % (FLUSH) 0.9 %
5-40 SYRINGE (ML) INJECTION EVERY 12 HOURS SCHEDULED
Status: DISCONTINUED | OUTPATIENT
Start: 2022-11-15 | End: 2022-11-15 | Stop reason: HOSPADM

## 2022-11-15 RX ORDER — LIDOCAINE HYDROCHLORIDE 20 MG/ML
INJECTION, SOLUTION EPIDURAL; INFILTRATION; INTRACAUDAL; PERINEURAL PRN
Status: DISCONTINUED | OUTPATIENT
Start: 2022-11-15 | End: 2022-11-15 | Stop reason: SDUPTHER

## 2022-11-15 RX ADMIN — PROPOFOL 50 MG: 10 INJECTION, EMULSION INTRAVENOUS at 13:34

## 2022-11-15 RX ADMIN — PROPOFOL 50 MG: 10 INJECTION, EMULSION INTRAVENOUS at 13:32

## 2022-11-15 RX ADMIN — PROPOFOL 50 MG: 10 INJECTION, EMULSION INTRAVENOUS at 13:36

## 2022-11-15 RX ADMIN — PROPOFOL 50 MG: 10 INJECTION, EMULSION INTRAVENOUS at 13:50

## 2022-11-15 RX ADMIN — PROPOFOL 50 MG: 10 INJECTION, EMULSION INTRAVENOUS at 13:38

## 2022-11-15 RX ADMIN — PROPOFOL 50 MG: 10 INJECTION, EMULSION INTRAVENOUS at 13:24

## 2022-11-15 RX ADMIN — PROPOFOL 50 MG: 10 INJECTION, EMULSION INTRAVENOUS at 13:28

## 2022-11-15 RX ADMIN — PROPOFOL 50 MG: 10 INJECTION, EMULSION INTRAVENOUS at 13:42

## 2022-11-15 RX ADMIN — GLYCOPYRROLATE 0.2 MG: 0.2 INJECTION, SOLUTION INTRAMUSCULAR; INTRAVENOUS at 13:14

## 2022-11-15 RX ADMIN — PROPOFOL 100 MG: 10 INJECTION, EMULSION INTRAVENOUS at 13:23

## 2022-11-15 RX ADMIN — PROPOFOL 50 MG: 10 INJECTION, EMULSION INTRAVENOUS at 13:26

## 2022-11-15 RX ADMIN — SODIUM CHLORIDE: 9 INJECTION, SOLUTION INTRAVENOUS at 12:46

## 2022-11-15 RX ADMIN — LIDOCAINE HYDROCHLORIDE 100 MG: 20 INJECTION, SOLUTION EPIDURAL; INFILTRATION; INTRACAUDAL; PERINEURAL at 13:23

## 2022-11-15 RX ADMIN — PROPOFOL 50 MG: 10 INJECTION, EMULSION INTRAVENOUS at 13:30

## 2022-11-15 RX ADMIN — SODIUM CHLORIDE: 9 INJECTION, SOLUTION INTRAVENOUS at 13:14

## 2022-11-15 RX ADMIN — PROPOFOL 50 MG: 10 INJECTION, EMULSION INTRAVENOUS at 13:40

## 2022-11-15 RX ADMIN — PROPOFOL 50 MG: 10 INJECTION, EMULSION INTRAVENOUS at 13:45

## 2022-11-15 ASSESSMENT — PAIN - FUNCTIONAL ASSESSMENT: PAIN_FUNCTIONAL_ASSESSMENT: NONE - DENIES PAIN

## 2022-11-15 ASSESSMENT — ENCOUNTER SYMPTOMS: SHORTNESS OF BREATH: 0

## 2022-11-15 NOTE — H&P
[facility]oloy   Attending Pre-operative History and Physical    INDICATION:  reflux, screening colonoscopy. PROCEDURE:  EGD    History Obtained From:  patient    HISTORY OF PRESENT ILLNESS:    The patient is a 60 y.o. female presents for an upper endoscopy and a colonoscopy. Past Medical History:    Past Medical History:   Diagnosis Date    Acid reflux     Cancer ([facility] [address]     SKIN    Chronic cough     Depression     Diabetes mellitus ([facility] [address]     Hyperlipidemia     Irregular heart beat     Pre-diabetes     Skin cancer       Past Surgical History:    Past Surgical History:   Procedure Laterality Date    COLONOSCOPY  12/29/2008    normal dr René Maria, check in 7-10 years    COLONOSCOPY  05/31/2017    dr René Maria polyp repeat in 5 years    FRACTURE SURGERY Bilateral     legs, pins in legs removed. HYSTERECTOMY (CERVIX STATUS UNKNOWN)  2003    complete    PAIN MANAGEMENT PROCEDURE Right 01/06/2020    RIGHT L4 AND L5 TRANSFORAMINAL EPIDURAL STEROID INJECTION WITH FLUOROSCOPY (98770, [id number]) performed by Salome Mccurdy MD at [place] [address]  01/30/2004    dr René Maria    UPPER GASTROINTESTINAL ENDOSCOPY  12/05/2014    gastric ulcers, dr René Maria    UPPER GASTROINTESTINAL ENDOSCOPY  05/31/2017    gastritis no ulcers dr René Maria    WRIST ARTHROPLASTY        Medications Prior to Admission:   Prior to Admission medications    Medication Sig Start Date End Date Taking?  Authorizing Provider   glucose monitoring ([employer]) kit Please dispense Accu-chek meter per patient request to be used as directed to check blood sugar 11/8/22   Zari Cedillo MD   blood glucose monitor strips Test one time a day 11/7/22   Zari Cedillo MD   simvastatin (ZOCOR) 40 MG tablet Take 1 tablet by mouth nightly 11/3/22   Vicki Escalera MD   tiotropium-olodaterol (STIOLTO RESPIMAT) 2.5-2.5 MCG/ACT AERS Inhale 2 puffs into the lungs daily 11/3/22   Vicki Escalera MD   insulin glargine (LANTUS SOLOSTAR) 100 UNIT/ML injection pen ADMINISTER 24-34  UNITS UNDER THE SKIN EVERY NIGHT AT BEDTIME 11/2/22   Adonica Galeazzi, MD   metFORMIN (GLUCOPHAGE) 500 MG tablet Take 2 tablets by mouth 2 times daily (with meals) 10/31/22   Stepan Muniz MD   sertraline (ZOLOFT) 50 MG tablet TAKE 1 TABLET BY MOUTH EVERY DAY 10/20/22   Stepan Muniz MD   famotidine (PEPCID) 20 MG tablet TAKE 1 TABLET BY MOUTH TWICE DAILY 10/20/22   Stepan Muniz MD   propranolol (INDERAL) 20 MG tablet TAKE ONE TABLET BY MOUTH TWICE A DAY 9/29/22   Stepan Muniz MD   azelastine (ASTELIN) 0.1 % nasal spray SPRAY 2 SPRAYS IN EACH NOSTRIL DAILY AS DIRECTED 9/6/22   TANNER Bravo - CNP   albuterol sulfate HFA (PROAIR HFA) 108 (90 Base) MCG/ACT inhaler Inhale 2 puffs into the lungs every 6 hours as needed for Wheezing or Shortness of Breath 8/15/22   Douglas Doguherty MD   empagliflozin (JARDIANCE) 10 MG tablet Take 1 tablet by mouth in the morning. 7/29/22   Adonica Galeazzi, MD   Insulin Pen Needle (B-D UF III MINI PEN NEEDLES) 31G X 5 MM MISC 1 each by Does not apply route daily 7/29/22   Adonica Galeazzi, MD   glipiZIDE (GLUCOTROL) 5 mg tablet TAKE TWO TABLETS BY MOUTH TWICE A DAY BEFORE MEALS 6/3/22   Stepan Muniz MD   fluticasone (FLONASE) 50 MCG/ACT nasal spray SPRAY ONE SPRAY IN EACH NOSTRIL TWICE DAILY 1/6/22   Douglas Dougherty MD   albuterol sulfate HFA (VENTOLIN HFA) 108 (90 Base) MCG/ACT inhaler INHALE TWO PUFFS BY MOUTH FOUR TIMES A DAY AS NEEDED FOR WHEEZING OR COUGH 12/13/21   Douglas Dougherty MD   Misc. Devices MISC Donut Cushion 6/29/21   Regine Woodward MD   Spacer/Aero-Holding Chambers (AEROCHAMBER PLUS SPENSER-VU) MISC USE WITH INHALER FOUR TIMES A DAY AS NEEDED 7/20/20   Stepan Muniz MD   blood glucose monitor kit and supplies Test one times a day & as needed for symptoms of irregular blood glucose.  5/22/20   Stepan Muniz MD   omeprazole (PRILOSEC) 20 MG delayed release capsule Take 1 capsule by mouth 2 times daily (before meals)  Patient taking differently: Take 20 mg by mouth Daily 19   Cooper Jauregui MD   vitamin D (CHOLECALCIFEROL) 1000 UNIT TABS tablet Take 1,000 Units by mouth daily    Historical Provider, MD        Allergies:  Ampicillin, Doxycycline, Bee venom, Cholestatin, Doxycycline calcium, Doxycycline monohydrate, Vibramycin [doxycycline calcium], and Cefuroxime axetil  History of allergic reaction to anesthesia:  No    Social History:   Social History     Socioeconomic History    Marital status:      Spouse name: Not on file    Number of children: Not on file    Years of education: Not on file    Highest education level: Not on file   Occupational History    Not on file   Tobacco Use    Smoking status: Former     Packs/day: 1.00     Years: 33.00     Pack years: 33.00     Types: Cigarettes     Quit date: 2005     Years since quittin.5    Smokeless tobacco: Never   Vaping Use    Vaping Use: Never used   Substance and Sexual Activity    Alcohol use: No    Drug use: No    Sexual activity: Not on file   Other Topics Concern    Not on file   Social History Narrative    Not on file     Social Determinants of Health     Financial Resource Strain: Low Risk     Difficulty of Paying Living Expenses: Not hard at all   Food Insecurity: No Food Insecurity    Worried About Running Out of Food in the Last Year: Never true    Ran Out of Food in the Last Year: Never true   Transportation Needs: No Transportation Needs    Lack of Transportation (Medical): No    Lack of Transportation (Non-Medical):  No   Physical Activity: Not on file   Stress: Not on file   Social Connections: Not on file   Intimate Partner Violence: Not on file   Housing Stability: Not on file      Family History:   Family History   Problem Relation Age of Onset    Diabetes Mother     Heart Attack Mother     Cancer Father         lung cancer    Cancer Sister         skin    Arthritis Sister     Heart Disease Maternal Grandmother     Heart Disease Maternal Grandfather     Cancer Paternal Grandmother     Cancer Paternal Grandfather       REVIEW OF SYSTEMS:    reviewed    PHYSICAL EXAM:      /67   Pulse 85   Temp 97.3 °F (36.3 °C) (Temporal)   Resp 16   Ht 5' 7\" (1.702 m)   Wt 225 lb (102.1 kg)   SpO2 95%   BMI 35.24 kg/m²  I      Head/ENT:  normocephalic, without obvious abnormalities, atraumatic    Heart:  normal S1 and S2    Lungs:  No increased work of breathing, good air exchange, clear to auscultation bilaterally,no crackles or wheezing    Abdomen:  Normal bowel sounds, soft nondistended, non tender    Extremities:  No clubbing, cyanosis, or edema      DATA:  reviewed    ASSESSMENT AND PLAN:    1. Patient is a 60 y.o. female with above specified procedure planned EGD and Colonoscopy with deep sedation  2. Procedure options, risks and benefits reviewed with patient. Patient expresses understanding.

## 2022-11-15 NOTE — ANESTHESIA POSTPROCEDURE EVALUATION
Department of Anesthesiology  Postprocedure Note    Patient: Marquis Ramesh  MRN: [id number]  YOB: 1960  Date of evaluation: 11/15/2022      Procedure Summary     Date: 11/15/22 Room / Location: [address] [place] [address]    Anesthesia Start: 0301 Anesthesia Stop: 1404    Procedures:       COLONOSCOPY POLYPECTOMY SNARE/COLD BIOPSY      EGD BIOPSY      ESOPHAGEAL DILATION Faye Schaefer Diagnosis:       Screen for colon cancer      Chronic cough      (Screen for colon cancer, Chronic cough)    Surgeons: Mable Barron MD Responsible Provider: Naomie Roy MD    Anesthesia Type: MAC ASA Status: 3          Anesthesia Type: No value filed. Rnoak Phase I: Ronak Score: 10    Ronak Phase II: Ronak Score: 10      Anesthesia Post Evaluation    Patient location during evaluation: PACU  Patient participation: complete - patient participated  Level of consciousness: awake  Airway patency: patent  Nausea & Vomiting: no nausea and no vomiting  Cardiovascular status: blood pressure returned to baseline  Respiratory status: acceptable  Hydration status: stable  Comments: Vital signs stable  OK to discharge from Stage I post anesthesia care.   Care transferred from Anesthesiology department on discharge from perioperative area   Multimodal analgesia pain management approach

## 2022-11-15 NOTE — DISCHARGE INSTRUCTIONS

## 2022-11-15 NOTE — ANESTHESIA PRE PROCEDURE
Department of Anesthesiology  Preprocedure Note       Name:  Julia Menezes   Age:  60 y.o.  :  1960                                          MRN:  [id number]         Date:  11/15/2022      Surgeon: Bo Allan):  Lelo Phillips MD    Procedure: Procedure(s):  COLORECTAL CANCER SCREENING, NOT HIGH RISK  EGD ESOPHAGOGASTRODUODENOSCOPY    Medications prior to admission:   Prior to Admission medications    Medication Sig Start Date End Date Taking?  Authorizing Provider   glucose monitoring ([employer] kit Please dispense Accu-chek meter per patient request to be used as directed to check blood sugar 22   Zari Cedillo MD   blood glucose monitor strips Test one time a day 22   Zari Cedillo MD   simvastatin (ZOCOR) 40 MG tablet Take 1 tablet by mouth nightly 11/3/22   Vicki Escalera MD   tiotropium-olodaterol (STIOLTO RESPIMAT) 2.5-2.5 MCG/ACT AERS Inhale 2 puffs into the lungs daily 11/3/22   Vicki Escalera MD   insulin glargine (LANTUS SOLOSTAR) 100 UNIT/ML injection pen ADMINISTER 24-34  UNITS UNDER THE SKIN EVERY NIGHT AT BEDTIME 22   Zari Cedillo MD   metFORMIN (GLUCOPHAGE) 500 MG tablet Take 2 tablets by mouth 2 times daily (with meals) 10/31/22   Vicki Escalera MD   sertraline (ZOLOFT) 50 MG tablet TAKE 1 TABLET BY MOUTH EVERY DAY 10/20/22   Vicki Escalera MD   famotidine (PEPCID) 20 MG tablet TAKE 1 TABLET BY MOUTH TWICE DAILY 10/20/22   Vicki Escalera MD   propranolol (INDERAL) 20 MG tablet TAKE ONE TABLET BY MOUTH TWICE A DAY 22   Vicki Escalera MD   azelastine (ASTELIN) 0.1 % nasal spray SPRAY 2 SPRAYS IN EACH NOSTRIL DAILY AS DIRECTED 22   TANNER Linda - CNP   albuterol sulfate HFA (PROAIR HFA) 108 (90 Base) MCG/ACT inhaler Inhale 2 puffs into the lungs every 6 hours as needed for Wheezing or Shortness of Breath 8/15/22   Angelo Wynne MD   empagliflozin (JARDIANCE) 10 MG tablet Take 1 tablet by mouth in the morning. 22   Zari Cedillo MD   Insulin Pen Needle (B-D UF III MINI PEN NEEDLES) 31G X 5 MM MISC 1 each by Does not apply route daily 7/29/22   Jamie Michael MD   glipiZIDE (GLUCOTROL) 5 mg tablet TAKE TWO TABLETS BY MOUTH TWICE A DAY BEFORE MEALS 6/3/22   Chloe Kawasaki, MD   fluticasone [facility]) 50 MCG/ACT nasal spray SPRAY ONE SPRAY IN EACH NOSTRIL TWICE DAILY 1/6/22   Coco Bhatt MD   albuterol sulfate HFA (VENTOLIN HFA) 108 (90 Base) MCG/ACT inhaler INHALE TWO PUFFS BY MOUTH FOUR TIMES A DAY AS NEEDED FOR WHEEZING OR COUGH 12/13/21   Coco Bhatt MD   Misc. Devices MISC Donut Cushion 6/29/21   Gerri Baeza MD   Spacer/Aero-Holding Chambers (AEROCHAMBER PLUS SPENSER-VU) MISC USE WITH INHALER FOUR TIMES A DAY AS NEEDED 7/20/20   Chloe Kawasaki, MD   blood glucose monitor kit and supplies Test one times a day & as needed for symptoms of irregular blood glucose. 5/22/20   Chloe Kawasaki, MD   omeprazole (PRILOSEC) 20 MG delayed release capsule Take 1 capsule by mouth 2 times daily (before meals)  Patient taking differently: Take 20 mg by mouth Daily 8/20/19   Chloe Kawasaki, MD   vitamin D (CHOLECALCIFEROL) 1000 UNIT TABS tablet Take 1,000 Units by mouth daily    Historical Provider, MD       Current medications:    Current Facility-Administered Medications   Medication Dose Route Frequency Provider Last Rate Last Admin    0.9 % sodium chloride infusion   IntraVENous Continuous April Moncada MD 75 mL/hr at 11/15/22 1246 New Bag at 11/15/22 1246    sodium chloride flush 0.9 % injection 5-40 mL  5-40 mL IntraVENous 2 times per day April Moncada MD        sodium chloride flush 0.9 % injection 5-40 mL  5-40 mL IntraVENous PRN April Moncada MD        0.9 % sodium chloride infusion   IntraVENous PRN April Moncada MD           Allergies: Allergies   Allergen Reactions    Ampicillin Rash, Hives, Shortness Of Breath and Swelling     Tongue and throat swelling, thinks hear and lungs affected.       Doxycycline Hives, Shortness Of Breath and Swelling Tongue and throat swelling, thinks hear and lungs affected.  Bee Venom Swelling    Cholestatin Other (See Comments)     Dust mites    Doxycycline Calcium     Doxycycline Monohydrate Swelling    Vibramycin [Doxycycline Calcium]     Cefuroxime Axetil Rash, Hives and Swelling     burning         Problem List:    Patient Active Problem List   Diagnosis Code    Type 2 diabetes mellitus without complication ([facility]) G89.8    Mixed hyperlipidemia E78.2    Alopecia L65.9    Sprain of ligament of lumbosacral joint S33. 9XXA    GERD (gastroesophageal reflux disease) K21.9    Chronic cough R05.3    Mild intermittent asthma J45.20    Centrilobular emphysema ([facility]) J43.2    Rotator cuff tendinitis, left M75.82    Rotator cuff tendinitis, right M75.81    Chronic renal disease, stage III ([facility] [address] [491234] N18.30       Past Medical History:        Diagnosis Date    Acid reflux     Cancer (HCC)     SKIN    Chronic cough     Depression     Diabetes mellitus ([facility] [address]     Hyperlipidemia     Irregular heart beat     Pre-diabetes     Skin cancer        Past Surgical History:        Procedure Laterality Date    COLONOSCOPY  12/29/2008    normal dr Indira Diane, check in 7-10 years    COLONOSCOPY  05/31/2017    dr Indira Diane polyp repeat in 5 years    FRACTURE SURGERY Bilateral     legs, pins in legs removed.     HYSTERECTOMY (CERVIX STATUS UNKNOWN)  2003    complete    PAIN MANAGEMENT PROCEDURE Right 01/06/2020    RIGHT L4 AND L5 TRANSFORAMINAL EPIDURAL STEROID INJECTION WITH FLUOROSCOPY ([id number], [id number]) performed by Margot Navarro MD at [address] [place] [address]  01/30/2004    dr Indira Diane   [address] [place] [address] Drive  12/05/2014    gastric ulcers, dr Brittaney Tate ENDOSCOPY  05/31/2017    gastritis no ulcers dr Indira Diane    WRIST ARTHROPLASTY         Social History:    Social History     Tobacco Use    Smoking status: Former     Packs/day: 1.00     Years: 33.00     Pack years: 33.00     Types: Cigarettes     Quit date: 2005     Years since quittin.5    Smokeless tobacco: Never   Substance Use Topics    Alcohol use: No                                Counseling given: Not Answered      Vital Signs (Current):   Vitals:    22 1221 11/15/22 1236 11/15/22 1243   BP:   131/67   Pulse:   85   Resp:   16   Temp:   97.3 °F (36.3 °C)   TempSrc:   Temporal   SpO2:   95%   Weight: 225 lb (102.1 kg) 225 lb (102.1 kg)    Height: 5' 7\" (1.702 m) 5' 7\" (1.702 m)                                               BP Readings from Last 3 Encounters:   11/15/22 131/67   22 132/80   10/31/22 124/80       NPO Status: Time of last liquid consumption: 0900                        Time of last solid consumption: 1600                        Date of last liquid consumption: 11/15/22                        Date of last solid food consumption: 22    BMI:   Wt Readings from Last 3 Encounters:   11/15/22 225 lb (102.1 kg)   22 227 lb (103 kg)   10/31/22 224 lb (101.6 kg)     Body mass index is 35.24 kg/m².     CBC:   Lab Results   Component Value Date/Time    WBC 7.7 2021 02:33 PM    RBC 4.29 2021 02:33 PM    HGB 12.2 2021 02:33 PM    HCT 37.9 2021 02:33 PM    MCV 88.2 2021 02:33 PM    RDW 14.8 2021 02:33 PM     2021 02:33 PM       CMP:   Lab Results   Component Value Date/Time     10/04/2022 10:57 AM    K 4.8 10/04/2022 10:57 AM    CL 99 10/04/2022 10:57 AM    CO2 23 10/04/2022 10:57 AM    BUN 20 10/04/2022 10:57 AM    CREATININE 1.0 10/04/2022 10:57 AM    GFRAA >60 10/04/2022 10:57 AM    GFRAA >60 2013 12:09 PM    AGRATIO 1.4 10/04/2022 10:57 AM    LABGLOM 56 10/04/2022 10:57 AM    LABGLOM 52.2 2011 04:08 PM    GLUCOSE 240 10/04/2022 10:57 AM    GLUCOSE 102 2011 04:08 PM    PROT 6.9 10/04/2022 10:57 AM    PROT 7.2 2013 12:09 PM    CALCIUM 10.0 10/04/2022 10:57 AM    BILITOT 0.3 10/04/2022 10:57 AM ALKPHOS 91 10/04/2022 10:57 AM    AST 13 10/04/2022 10:57 AM    ALT 17 10/04/2022 10:57 AM       POC Tests:   Recent Labs     11/15/22  1246   POCGLU 214*       Coags: No results found for: PROTIME, INR, APTT    HCG (If Applicable): No results found for: PREGTESTUR, PREGSERUM, HCG, HCGQUANT     ABGs: No results found for: PHART, PO2ART, BOH7XPW, SNG0WIV, BEART, K8OMEGHI     Type & Screen (If Applicable):  No results found for: LABABO, LABRH    Drug/Infectious Status (If Applicable):  No results found for: HIV, HEPCAB    COVID-19 Screening (If Applicable): No results found for: COVID19        Anesthesia Evaluation  Patient summary reviewed no history of anesthetic complications:   Airway: Mallampati: I  TM distance: >3 FB   Neck ROM: full  Mouth opening: > = 3 FB   Dental: normal exam         Pulmonary:normal exam    (+) COPD:  asthma:     (-) shortness of breath                           Cardiovascular:  Exercise tolerance: good (>4 METS),   (+) hyperlipidemia    (-) hypertension, past MI, CAD, dysrhythmias (irregular heart beat) and  LEVY      Rhythm: regular  Rate: normal                    Neuro/Psych:   (+) depression/anxiety    (-) TIA, CVA and psychiatric history           GI/Hepatic/Renal:   (+) GERD:, renal disease: CRI, bowel prep,      (-) liver disease       Endo/Other:    (+) DiabetesType II DM, , .                 Abdominal:             Vascular: negative vascular ROS. Other Findings:           Anesthesia Plan      MAC     ASA 3     (62 yo F with PMHx of COPD/asthma, DM2, GERD, irregular heart beat presenting for EGD and colonoscopy. Discussed risks and benefits to sedation including nausea, vomiting, allergic reaction, headache, delayed cognitive recovery, stroke, heart attack, respiratory depression, and death which patient understood and agreed to proceed.    The patient was given the opportunity to ask questions and all questions were answered to the patient's satisfaction.  )  Induction: intravenous. Anesthetic plan and risks discussed with patient. Plan discussed with CRNA. This pre-anesthesia assessment may be used as a history and physical.    DOS STAFF ADDENDUM:    Pt seen and examined, chart reviewed (including anesthesia, drug and allergy history). No interval changes to history and physical examination. Anesthetic plan, risks, benefits, alternatives, and personnel involved discussed with patient. Patient verbalized an understanding and agrees to proceed.       Denzel Hines MD  November 15, 2022  1:08 PM

## 2022-11-15 NOTE — OP NOTE
Colonoscopy Note    Patient:   Cinthya Wang    :    1960    Facility:   [facility] [Outpatient]  Referring/PCP: Kannan Mansfield MD  Procedure:   Colonoscopy   Date:     11/15/2022  Endoscopist:  Zack Malcolm MD, MD    Preoperative Diagnosis:  previous adenomatous polyp. Postoperative Diagnosis:   Colon polyps    Anesthesia: MAC    Estimated blood loss: Minimal    Complications:  None    Description of Procedure:  Informed consent was obtained from the patient after explanation of the procedure including indications, description of the procedure,  benefits and possible risks and complications of the procedure, and alternatives. Questions were answered. The patient's history was reviewed and a directed physical examination was performed prior to the procedure. Patient was monitored throughout the procedure with pulse oximetry and periodic assessment of vital signs. Patient was sedated as noted above. With the patient initially in the left lateral decubitus position, a digital rectal examination was performed and revealed negative without mass, lesions or tenderness. The Olympus video colonoscope was placed in the patient's rectum and advanced without difficulty  to the cecum, which was identified by the ileocecal valve and appendiceal orifice. The prep was good. Examination of the mucosa was performed during both introduction and withdrawal of the colonoscope. Retroflexed view of the rectum was performed. Findings:     A sessile 8 mm polyp was removed from the transverse colon with a hot snare. A sessile 3 mm polyp was removed from the transverse colon with a cold snare. Moderate diverticular disease was present in sigmoid colon. Small internal hemorrhoids were noted on the retroflexed view. Recommendations: Call in 1 week for pathology findings. Repeat interval depending on pathology. High-fiber diet.      Zack Malcolm MD, MD
pylori infection. In the duodenal bulb, a 1.5 cm polyp was noted. The polyp was captured and after further examination it appears that it is a pedunculated polyp starting at the pyloric channel /proximal duodenal bulb. The polyp was pulled into the stomach. Biopsies were obtained and sent for pathology. The distal duodenal bulb was normal.  The descending duodenum was normal.    Recommendations: Call in 1 week for pathology findings. Continue same medication. Further management of pyloric channel polyp pending biopsies.     Son Woods MD, MD

## 2022-12-08 ENCOUNTER — TELEPHONE (OUTPATIENT)
Dept: ENDOCRINOLOGY | Age: 62
End: 2022-12-08

## 2022-12-08 NOTE — TELEPHONE ENCOUNTER
Call from patient stating that her BS is high today   Pt stated this morning before breakfast was 171. She had a pastry and coffee this morning for breakfast     Pt stated at 2pm she checked her BS and it was 306. She stated that she hasn't had anything since breakfast and has only had water     Pt stated that she is currently feeling nauseous   She is wanting to know if she should take extra insulin when her BS is high?      Please advise   CB# [phone]

## 2022-12-09 NOTE — TELEPHONE ENCOUNTER
Spoke to patient to give her instructions. She reports that BS was 120 this morning and she feels much better. States the highs were only yesterday and that she had a lot of stress and didn't sleep well the night before. Advised her to hold on increasing insulin and continue to monitor her BS, call the office on Monday if there are any more spikes.

## 2022-12-09 NOTE — TELEPHONE ENCOUNTER
Please advise patient she can increase insulin dose by 4 units  If the high glucose persists, we may have to place her on a rapid acting insulin

## 2022-12-20 RX ORDER — ALBUTEROL SULFATE 90 UG/1
AEROSOL, METERED RESPIRATORY (INHALATION)
Qty: 6.7 G | OUTPATIENT
Start: 2022-12-20

## 2022-12-27 DIAGNOSIS — E11.9 TYPE 2 DIABETES MELLITUS WITHOUT COMPLICATION, WITHOUT LONG-TERM CURRENT USE OF INSULIN (HCC): Primary | ICD-10-CM

## 2022-12-27 RX ORDER — PEN NEEDLE, DIABETIC 31 GX5/16"
1 NEEDLE, DISPOSABLE MISCELLANEOUS DAILY
Qty: 100 EACH | Refills: 6 | Status: SHIPPED | OUTPATIENT
Start: 2022-12-27

## 2022-12-27 NOTE — TELEPHONE ENCOUNTER
Patient called requesting a refill     Rx- Insulin Pen Needle (B-D UF III MINI PEN NEEDLES) 31G X 5 MM [address] [place] [address] [address]- 11/2/22  NOV- 1/10/23    Please advise

## 2022-12-27 NOTE — TELEPHONE ENCOUNTER
Medication:   Requested Prescriptions     Pending Prescriptions Disp Refills    Insulin Pen Needle (B-D UF III MINI PEN NEEDLES) 31G X 5 MM MISC 100 each 6     Si each by Does not apply route daily       Last Filled:      Patient Phone Number: [phone] (home)     Last appt: 2022   Next appt: 1/10/23    Last Labs DM:   Lab Results   Component Value Date/Time    LABA1C 8.8 10/04/2022 10:57 AM

## 2023-01-02 RX ORDER — PROPRANOLOL HYDROCHLORIDE 20 MG/1
TABLET ORAL
Qty: 180 TABLET | Refills: 1 | Status: SHIPPED | OUTPATIENT
Start: 2023-01-02

## 2023-01-02 RX ORDER — GLIPIZIDE 5 MG/1
TABLET ORAL
Qty: 360 TABLET | Refills: 1 | Status: SHIPPED | OUTPATIENT
Start: 2023-01-02

## 2023-01-11 DIAGNOSIS — R05.3 CHRONIC COUGH: ICD-10-CM

## 2023-01-11 NOTE — TELEPHONE ENCOUNTER
Last appt: 9-8-2022  Next appt: No future appointment is scheduled at this time. Patient has canceled the last two of her scheduled appointments.    Medication matches medication on Epic list

## 2023-01-12 DIAGNOSIS — R05.3 CHRONIC COUGH: ICD-10-CM

## 2023-01-12 RX ORDER — FLUTICASONE PROPIONATE 50 MCG
SPRAY, SUSPENSION (ML) NASAL
Qty: 16 G | Refills: 11 | Status: SHIPPED | OUTPATIENT
Start: 2023-01-12

## 2023-01-12 RX ORDER — FLUTICASONE PROPIONATE 50 MCG
SPRAY, SUSPENSION (ML) NASAL
Qty: 48 G | OUTPATIENT
Start: 2023-01-12

## 2023-01-16 ENCOUNTER — TELEPHONE (OUTPATIENT)
Dept: FAMILY MEDICINE CLINIC | Age: 63
End: 2023-01-16

## 2023-01-16 DIAGNOSIS — R05.3 CHRONIC COUGH: ICD-10-CM

## 2023-01-16 RX ORDER — FAMOTIDINE 20 MG/1
TABLET, FILM COATED ORAL
Qty: 180 TABLET | Refills: 0 | Status: SHIPPED | OUTPATIENT
Start: 2023-01-16

## 2023-01-16 NOTE — TELEPHONE ENCOUNTER
Pt calling tested positive for covid and her sx are dry cough, labored breathing, diarrhea, low grade fever sx started last week. Pt tested on 1-14-23 she called the on call dr on saturday and was told it is to late to start antibiotics.     Please advise  [phone]

## 2023-01-16 NOTE — TELEPHONE ENCOUNTER
If symptoms are over 5 days yes it is too late to start  if she is having breathing problems as she suggests then she should go to the ER

## 2023-01-25 ENCOUNTER — OFFICE VISIT (OUTPATIENT)
Dept: PULMONOLOGY | Age: 63
End: 2023-01-25
Payer: MEDICARE

## 2023-01-25 VITALS
OXYGEN SATURATION: 98 % | RESPIRATION RATE: 21 BRPM | HEART RATE: 88 BPM | SYSTOLIC BLOOD PRESSURE: 115 MMHG | DIASTOLIC BLOOD PRESSURE: 70 MMHG | HEIGHT: 67 IN | WEIGHT: 220.4 LBS | TEMPERATURE: 97.5 F | BODY MASS INDEX: 34.59 KG/M2

## 2023-01-25 DIAGNOSIS — J44.9 ASTHMA-COPD OVERLAP SYNDROME (HCC): ICD-10-CM

## 2023-01-25 DIAGNOSIS — R05.3 CHRONIC COUGH: Primary | ICD-10-CM

## 2023-01-25 PROCEDURE — G8427 DOCREV CUR MEDS BY ELIG CLIN: HCPCS | Performed by: INTERNAL MEDICINE

## 2023-01-25 PROCEDURE — 3023F SPIROM DOC REV: CPT | Performed by: INTERNAL MEDICINE

## 2023-01-25 PROCEDURE — 99214 OFFICE O/P EST MOD 30 MIN: CPT | Performed by: INTERNAL MEDICINE

## 2023-01-25 PROCEDURE — G8417 CALC BMI ABV UP PARAM F/U: HCPCS | Performed by: INTERNAL MEDICINE

## 2023-01-25 PROCEDURE — 3017F COLORECTAL CA SCREEN DOC REV: CPT | Performed by: INTERNAL MEDICINE

## 2023-01-25 PROCEDURE — 1036F TOBACCO NON-USER: CPT | Performed by: INTERNAL MEDICINE

## 2023-01-25 PROCEDURE — G8484 FLU IMMUNIZE NO ADMIN: HCPCS | Performed by: INTERNAL MEDICINE

## 2023-01-25 RX ORDER — GABAPENTIN 300 MG/1
300 CAPSULE ORAL NIGHTLY
Qty: 30 CAPSULE | Refills: 5 | Status: SHIPPED | OUTPATIENT
Start: 2023-01-25 | End: 2023-02-24

## 2023-01-25 NOTE — PROGRESS NOTES
[address], SLEEP, AND CRITICAL CARE   Misha Arrington (:  1960) is a 60 y.o. female,Established patient, here for evaluation of the following chief complaint(s):  Follow-up         ASSESSMENT/PLAN:  1. Chronic cough  Assessment & Plan:   - Likely multifactorial GERD. Cannot rule out postnasal drip.  -Has not seem to respond much therapies could be a neurogenic or habitual cough  -We will start gabapentin 300 nightly  -Follow-up with ENT and GI  Orders:  -     gabapentin (NEURONTIN) 300 MG capsule; Take 1 capsule by mouth nightly for 30 days. Intended supply: 30 days, Disp-30 capsule, R-5Normal  2. Asthma-COPD overlap syndrome (HCC)  Assessment & Plan:   - Mild background emphysema  -Quit smoking more than 20 years ago  -Symptoms well controlled on Stiolto, albuterol as needed      Return in about 6 months (around 2023). Future Appointments   Date Time Provider John Hamilton   2023  1:40 PM Homer Torres MD [facility] PULM [facility]       Subjective   SUBJECTIVE/OBJECTIVE:  Continues with cough, worse when lying down. Worse in the past couple weeks she is tested positive for COVID.  -Has not really ever benefited much from bronchodilators and PFTs were unremarkable  -Is on dual acid suppressant therapy  -Ordered by ENT and speech therapy      Review of Systems   Constitutional: Negative. Cardiovascular: Negative. Musculoskeletal: Negative. Neurological: Negative. Psychiatric/Behavioral: Negative. Objective   Physical Exam    Gen:  No acute distress. Eyes: PERRL. EOMI. Anicteric sclera. No conjunctival injection. ENT: No discharge. oropharynx clear. External appearance of ears and nose normal.  Neck: Trachea midline. No mass   Resp:  No crackles. No wheezes. No rhonchi. No dullness on percussion. CV: Regular rate. Regular rhythm. No murmur or rub. No edema. GI: Soft, Non-tender. Non-distended. +BS  Skin: Warm, dry, w/o erythema.    Lymph: No cervical or supraclavicular LAD. M/S: No cyanosis. No clubbing. Neuro:  no focal neurologic deficit. Moves all extremities  Psych: Awake and alert, Oriented x 3. Judgement and insight appropriate. Mood stable. An electronic signature was used to authenticate this note.     --Fadia Kilgore MD

## 2023-01-25 NOTE — ASSESSMENT & PLAN NOTE
- Mild background emphysema  -Quit smoking more than 20 years ago  -Symptoms well controlled on Stiolto, albuterol as needed

## 2023-01-25 NOTE — ASSESSMENT & PLAN NOTE
- Likely multifactorial GERD.   Cannot rule out postnasal drip.  -Has not seem to respond much therapies could be a neurogenic or habitual cough  -We will start gabapentin 300 nightly  -Follow-up with ENT and GI

## 2023-02-02 ENCOUNTER — TELEPHONE (OUTPATIENT)
Dept: FAMILY MEDICINE CLINIC | Age: 63
End: 2023-02-02

## 2023-02-02 RX ORDER — SIMVASTATIN 40 MG
TABLET ORAL
Qty: 90 TABLET | Refills: 1 | Status: SHIPPED | OUTPATIENT
Start: 2023-02-02

## 2023-02-02 NOTE — TELEPHONE ENCOUNTER
----- Message from Samy Sheridan sent at 2/2/2023 12:06 PM EST -----  Subject: Appointment Request    Reason for Call: Established Patient Appointment needed: Routine Medicare   AWV    QUESTIONS    Reason for appointment request? Available appointments did not meet   patient need     Additional Information for Provider? Patient would like to see Dr. Jaren Lopez,   pt also requesting for lab orders so she can get lab work done.  She wanted   to do wellness visit before trying to see a surgeon about her hand.  ---------------------------------------------------------------------------  --------------  Malik Cordova FJDH  [phone]; OK to leave message on voicemail  ---------------------------------------------------------------------------  --------------  SCRIPT ANSWERS  COVID Screen: Mercedes Farooq

## 2023-02-02 NOTE — TELEPHONE ENCOUNTER
----- Message from Arsenio Johansen sent at 2/2/2023 12:06 PM EST -----  Subject: Appointment Request    Reason for Call: Established Patient Appointment needed: Routine Medicare   AWV    QUESTIONS    Reason for appointment request? Available appointments did not meet   patient need     Additional Information for Provider? Patient would like to see Dr. Alayna Mendoza,   pt also requesting for lab orders so she can get lab work done.  She wanted   to do wellness visit before trying to see a surgeon about her hand.  ---------------------------------------------------------------------------  --------------  Serene GUTIERREZ  [phone]; OK to leave message on voicemail  ---------------------------------------------------------------------------  --------------  SCRIPT ANSWERS  COVID Screen: Bhanu Lantigua

## 2023-02-05 DIAGNOSIS — E11.9 TYPE 2 DIABETES MELLITUS WITHOUT COMPLICATION, WITHOUT LONG-TERM CURRENT USE OF INSULIN (HCC): ICD-10-CM

## 2023-02-06 RX ORDER — BLOOD-GLUCOSE METER
EACH MISCELLANEOUS
OUTPATIENT
Start: 2023-02-06

## 2023-02-16 ENCOUNTER — HOSPITAL ENCOUNTER (OUTPATIENT)
Age: 63
Discharge: HOME | End: 2023-02-16
Payer: MEDICARE

## 2023-02-16 ENCOUNTER — OFFICE VISIT (OUTPATIENT)
Dept: FAMILY MEDICINE CLINIC | Age: 63
End: 2023-02-16

## 2023-02-16 VITALS
HEART RATE: 88 BPM | BODY MASS INDEX: 34.44 KG/M2 | HEIGHT: 67 IN | TEMPERATURE: 97 F | DIASTOLIC BLOOD PRESSURE: 72 MMHG | SYSTOLIC BLOOD PRESSURE: 118 MMHG | WEIGHT: 219.4 LBS

## 2023-02-16 DIAGNOSIS — E11.9 TYPE 2 DIABETES MELLITUS WITHOUT COMPLICATION, WITHOUT LONG-TERM CURRENT USE OF INSULIN (HCC): Chronic | ICD-10-CM

## 2023-02-16 DIAGNOSIS — M79.641 PAIN OF RIGHT HAND: ICD-10-CM

## 2023-02-16 DIAGNOSIS — Z01.818 PREOP EXAMINATION: ICD-10-CM

## 2023-02-16 DIAGNOSIS — K21.9 GASTROESOPHAGEAL REFLUX DISEASE WITHOUT ESOPHAGITIS: ICD-10-CM

## 2023-02-16 DIAGNOSIS — Z01.818 PREOP EXAMINATION: Primary | ICD-10-CM

## 2023-02-16 LAB
A/G RATIO: 1.4 (ref 1.1–2.2)
ALBUMIN SERPL-MCNC: 4.1 G/DL (ref 3.4–5)
ALP BLD-CCNC: 94 U/L (ref 40–129)
ALT SERPL-CCNC: 15 U/L (ref 10–40)
ANION GAP SERPL CALCULATED.3IONS-SCNC: 15 MMOL/L (ref 3–16)
AST SERPL-CCNC: 13 U/L (ref 15–37)
BILIRUB SERPL-MCNC: <0.2 MG/DL (ref 0–1)
BUN BLDV-MCNC: 16 MG/DL (ref 7–20)
CALCIUM SERPL-MCNC: 9.3 MG/DL (ref 8.3–10.6)
CHLORIDE BLD-SCNC: 102 MMOL/L (ref 99–110)
CHOLESTEROL, TOTAL: 201 MG/DL (ref 0–199)
CO2: 21 MMOL/L (ref 21–32)
CREAT SERPL-MCNC: 0.9 MG/DL (ref 0.6–1.2)
GFR SERPL CREATININE-BSD FRML MDRD: >60 ML/MIN/{1.73_M2}
GLUCOSE BLD-MCNC: 188 MG/DL (ref 70–99)
HDLC SERPL-MCNC: 39 MG/DL (ref 40–60)
LDL CHOLESTEROL CALCULATED: ABNORMAL MG/DL
LDL CHOLESTEROL DIRECT: 107 MG/DL
POTASSIUM SERPL-SCNC: 4.3 MMOL/L (ref 3.5–5.1)
SODIUM BLD-SCNC: 138 MMOL/L (ref 136–145)
TOTAL PROTEIN: 7.1 G/DL (ref 6.4–8.2)
TRIGL SERPL-MCNC: 362 MG/DL (ref 0–150)
VLDLC SERPL CALC-MCNC: ABNORMAL MG/DL

## 2023-02-16 PROCEDURE — 93005 ELECTROCARDIOGRAM TRACING: CPT

## 2023-02-16 SDOH — ECONOMIC STABILITY: HOUSING INSECURITY
IN THE LAST 12 MONTHS, WAS THERE A TIME WHEN YOU DID NOT HAVE A STEADY PLACE TO SLEEP OR SLEPT IN A SHELTER (INCLUDING NOW)?: NO

## 2023-02-16 SDOH — ECONOMIC STABILITY: FOOD INSECURITY: WITHIN THE PAST 12 MONTHS, THE FOOD YOU BOUGHT JUST DIDN'T LAST AND YOU DIDN'T HAVE MONEY TO GET MORE.: NEVER TRUE

## 2023-02-16 SDOH — ECONOMIC STABILITY: FOOD INSECURITY: WITHIN THE PAST 12 MONTHS, YOU WORRIED THAT YOUR FOOD WOULD RUN OUT BEFORE YOU GOT MONEY TO BUY MORE.: NEVER TRUE

## 2023-02-16 SDOH — ECONOMIC STABILITY: INCOME INSECURITY: HOW HARD IS IT FOR YOU TO PAY FOR THE VERY BASICS LIKE FOOD, HOUSING, MEDICAL CARE, AND HEATING?: NOT HARD AT ALL

## 2023-02-16 ASSESSMENT — ENCOUNTER SYMPTOMS
DIARRHEA: 0
TROUBLE SWALLOWING: 0
NAUSEA: 0
CHEST TIGHTNESS: 0
SORE THROAT: 0
SHORTNESS OF BREATH: 0
BLOOD IN STOOL: 0
ABDOMINAL PAIN: 0
VOMITING: 0
COUGH: 0
ABDOMINAL DISTENTION: 0
CONSTIPATION: 0

## 2023-02-16 ASSESSMENT — PATIENT HEALTH QUESTIONNAIRE - PHQ9
SUM OF ALL RESPONSES TO PHQ9 QUESTIONS 1 & 2: 0
SUM OF ALL RESPONSES TO PHQ QUESTIONS 1-9: 0
SUM OF ALL RESPONSES TO PHQ QUESTIONS 1-9: 0
2. FEELING DOWN, DEPRESSED OR HOPELESS: 0
1. LITTLE INTEREST OR PLEASURE IN DOING THINGS: 0
SUM OF ALL RESPONSES TO PHQ QUESTIONS 1-9: 0
SUM OF ALL RESPONSES TO PHQ QUESTIONS 1-9: 0

## 2023-02-16 NOTE — PATIENT INSTRUCTIONS
No aspirin products or vitamins the week before the surgery   On the morning of the surgery take the propranolol, omeprazole with just enough water to get them down as soon as you get out of bed   The evening before the surgery only take one half of the insulin dose  Do not take any other diabetes medicines the evening before   See me in July

## 2023-02-16 NOTE — PROGRESS NOTES
Subjective:      Patient ID: Jessica Aviles is a 60 y.o. female. Chief Complaint   Patient presents with    Pre-op Exam     Right Hand Surgery on 2- by Dr. Sixto Onofre at [facility].          Patient presents with:  Pre-op Exam: Right Hand Surgery on 2- by Dr. Sixto Onofre at [facility].      Here for the above  Feeling well   Her right hand ring finger triggers and hurts     height is 5' 7\" (1.702 m) and weight is 219 lb 6.4 oz (99.5 kg). Her temporal temperature is 97 °F (36.1 °C). Her blood pressure is 118/72 and her pulse is 88. Allergies:   -- Ampicillin -- Rash, Hives, Shortness Of Breath                            and Swelling    --  Tongue and throat swelling, thinks hear and lungs             affected. -- Doxycycline -- Hives, Shortness Of Breath and                            Swelling    --  Tongue and throat swelling, thinks hear and lungs             affected. -- Bee Venom -- Swelling   -- Cholestatin -- Other (See Comments)    --  Dust mites    -- Cefuroxime Axetil -- Rash, Hives and Swelling    --  burning    No tobacco since 2005. No ETOH use.      Current Outpatient Medications:   ·  simvastatin (ZOCOR) 40 MG tablet, TAKE 1 TABLET BY MOUTH EVERY NIGHT,  ·  sertraline (ZOLOFT) 50 MG tablet, TAKE 1 TABLET BY MOUTH EVERY DAY,  ·  famotidine (PEPCID) 20 MG tablet, TAKE 1 TABLET BY MOUTH TWICE DAILY,   ·  fluticasone (FLONASE) 50 MCG/ACT nasal spray, SPRAY ONCE IN EACH NOSTRIL TWICE DAILY  ·  glipiZIDE (GLUCOTROL) 5 MG tablet, TAKE TWO TABLETS BY MOUTH TWICE A DAY BEFORE MEALS,  ·  empagliflozin (JARDIANCE) 10 MG tablet, Take 1 tablet by mouth daily,   ·  propranolol (INDERAL) 20 MG tablet, TAKE ONE TABLET BY MOUTH TWICE A DAY,   ·  Insulin Pen Needle (B-D UF III MINI PEN NEEDLES) 31G X 5 MM MISC, 1 each by Does not apply route daily,  ·  glucose monitoring (FREESTYLE FREEDOM) kit, Please dispense Accu-chek meter per patient request to be used as directed to check blood sugar,  ·  blood glucose monitor strips, Test one time a day,  ·  tiotropium-olodaterol (STIOLTO RESPIMAT) 2.5-2.5 MCG/ACT AERS, Inhale 2 puffs into the lungs daily  ·  insulin glargine (LANTUS SOLOSTAR) 100 UNIT/ML injection pen, ADMINISTER 24-34  UNITS UNDER THE SKIN EVERY NIGHT AT BEDTIME,  ·  metFORMIN (GLUCOPHAGE) 500 MG tablet, Take 2 tablets by mouth 2 times daily (with meals)  ·  azelastine (ASTELIN) 0.1 % nasal spray, SPRAY 2 SPRAYS IN EACH NOSTRIL DAILY AS DIRECTED,  ·  albuterol sulfate HFA (PROAIR HFA) 108 (90 Base) MCG/ACT inhaler, Inhale 2 puffs into the lungs every 6 hours as needed for Wheezing or Shortness of Breath,  ·  albuterol sulfate HFA (VENTOLIN HFA) 108 (90 Base) MCG/ACT inhaler, INHALE TWO PUFFS BY MOUTH FOUR TIMES A DAY AS NEEDED FOR WHEEZING OR COUGH  ·  Misc. Devices MISC, Donut Cushion,   ·  Spacer/Aero-Holding Chambers (AEROCHAMBER PLUS SPENSER-VU) MISC, USE WITH INHALER FOUR TIMES A DAY AS NEEDED,  ·  blood glucose monitor kit and supplies, Test one times a day & as needed for symptoms of irregular blood glucose  ·  omeprazole (PRILOSEC) 20 MG delayed release capsule, Take 1 capsule by mouth 2 times daily (before meals) (Patient taking differently: Take 20 mg by mouth Daily)  ·  vitamin D (CHOLECALCIFEROL) 1000 UNIT TABS tablet, Take 1,000 Units by mouth daily    Past Surgical History:  12/29/2008: COLONOSCOPY      Comment:  normal dr Donnis Holter, check in 7-10 years  05/31/2017: COLONOSCOPY      Comment:  dr Donnis Holter polyp repeat in 5 years  11/15/2022: COLONOSCOPY; N/A      Comment:  COLONOSCOPY POLYPECTOMY SNARE/COLD BIOPSY performed by                Leonel Stahl MD at [place] [address]  11/15/2022: ESOPHAGEAL DILATATION; N/A      Comment:  ESOPHAGEAL DILATION Nuris Sake performed by Kim Cummins MD at [facility]  No date: FRACTURE SURGERY; Bilateral      Comment:  legs, pins in legs removed. No date: HAND SURGERY;  Left Comment:  tendon repair  2003: HYSTERECTOMY (CERVIX STATUS UNKNOWN)      Comment:  complete  01/06/2020: PAIN MANAGEMENT PROCEDURE; Right      Comment:  RIGHT L4 AND L5 TRANSFORAMINAL EPIDURAL STEROID                INJECTION WITH FLUOROSCOPY (83064, [id number]) performed by                Jaun Maier MD at [address]  01/30/2004: UPPER GASTROINTESTINAL ENDOSCOPY      Comment:  dr Wilfred Kaufman  12/05/2014: UPPER GASTROINTESTINAL ENDOSCOPY      Comment:  gastric ulcers, dr Wilfred Kaufman  05/31/2017: UPPER GASTROINTESTINAL ENDOSCOPY      Comment:  gastritis no ulcers dr Wilfred Kaufman  11/15/2022: UPPER GASTROINTESTINAL ENDOSCOPY; N/A      Comment:  EGD BIOPSY performed by Tobin Alvarez MD at [facility]  No date: WRIST ARTHROPLASTY    No problem with anesthesia    Past Medical History:  No date: Acid reflux  No date: Cancer ([facility]      Comment:  SKIN  No date: Chronic cough  No date: Depression  No date: [facility] ([facility]  No date: Hyperlipidemia  No date: Irregular heart beat  No date: Pre-diabetes  No date: Skin cancer    Family hx  No anesthesia             Review of Systems   Constitutional:  Negative for appetite change, chills, fever and unexpected weight change. HENT:  Negative for sore throat and trouble swallowing. Hx of damaged vocal cords from acid reflux   No loose teeth . No front teeth with cap/crown    Respiratory:  Negative for cough, chest tightness and shortness of breath. Hx of cough and better. Has seen pulmonary    Cardiovascular:  Negative for chest pain, palpitations and leg swelling. No hx of heart disease. Hx of palpitation. She can do heavy work at home. Vacuum floor no cp no sob. Carries items up and down stairs no cp no sob. She pushes grocery cart and load and unload groceries no cp no sob     Gastrointestinal:  Negative for abdominal distention, abdominal pain, blood in stool, constipation, diarrhea, nausea and vomiting.         No gerd no dysphagia no hx of hepatitis    Genitourinary:  Negative for difficulty urinating, dysuria and hematuria. Musculoskeletal:  Negative for neck pain. Hx of back pain    Skin:  Negative for rash. Neurological:  Negative for dizziness, syncope and headaches. No unusual ha. Had had not new    Hematological:  Negative for adenopathy. Does not bruise/bleed easily. No hx of blood clot      Objective:   Physical Exam  Constitutional:       General: She is not in acute distress. Appearance: Normal appearance. She is well-developed. She is not ill-appearing or diaphoretic. HENT:      Head: Normocephalic and atraumatic. Right Ear: Tympanic membrane and ear canal normal.      Left Ear: Tympanic membrane and ear canal normal.      Nose: Nose normal.      Mouth/Throat:      Lips: Pink. Mouth: Mucous membranes are moist. No oral lesions. Pharynx: Oropharynx is clear. Eyes:      General: No scleral icterus. Neck:      Thyroid: No thyromegaly. Cardiovascular:      Rate and Rhythm: Normal rate and regular rhythm. Heart sounds: Normal heart sounds. No murmur heard. No friction rub. No gallop. Comments:     Pulmonary:      Effort: Pulmonary effort is normal. No tachypnea, accessory muscle usage or respiratory distress. Breath sounds: Normal breath sounds. No decreased breath sounds, wheezing, rhonchi or rales. Abdominal:      General: Bowel sounds are normal. There is no distension or abdominal bruit. Palpations: Abdomen is soft. There is no hepatomegaly, splenomegaly, mass or pulsatile mass. Tenderness: There is no abdominal tenderness. There is no guarding. Musculoskeletal:      Cervical back: Neck supple. Lymphadenopathy:      Head:      Right side of head: No submental or submandibular adenopathy. Left side of head: No submental or submandibular adenopathy. Cervical: No cervical adenopathy.       Upper Body:      Right upper body: No supraclavicular adenopathy. Left upper body: No supraclavicular adenopathy. Skin:     General: Skin is warm and dry. Coloration: Skin is not pale. Nails: There is no clubbing. Neurological:      Mental Status: She is alert. Assessment:         Diagnosis Orders   1. Preop examination  Comprehensive Metabolic Panel    Hemoglobin A1C    Lipid Panel    EKG 12 lead      2. Pain of right hand        3. Type 2 diabetes mellitus without complication, without long-term current use of insulin (HCC)  Comprehensive Metabolic Panel    Hemoglobin A1C    Lipid Panel    EKG 12 lead      4.  Gastroesophageal reflux disease without esophagitis          Ok for surgery pending labs w/u    Colon polyp 11/15/22 dr Sophia Brown and egd done same date and polyp noted in area of pyloric channel   Pulmonary visit on 1/25/23 reviewed       Plan:      No aspirin products or vitamins the week before the surgery   On the morning of the surgery take the propranolol, omeprazole with just enough water to get them down as soon as you get out of bed   The evening before the surgery only take one half of the insulin dose  Do not take any other diabetes medicines the evening before   See me in July         Nicholas Buck MD

## 2023-02-17 LAB
EKG ATRIAL RATE: 93 BPM
EKG DIAGNOSIS: NORMAL
EKG P AXIS: 35 DEGREES
EKG P-R INTERVAL: 144 MS
EKG Q-T INTERVAL: 376 MS
EKG QRS DURATION: 88 MS
EKG QTC CALCULATION (BAZETT): 467 MS
EKG R AXIS: 2 DEGREES
EKG T AXIS: 10 DEGREES
EKG VENTRICULAR RATE: 93 BPM
ESTIMATED AVERAGE GLUCOSE: 191.5 MG/DL
HBA1C MFR BLD: 8.3 %

## 2023-02-17 RX ORDER — ROSUVASTATIN CALCIUM 20 MG/1
20 TABLET, COATED ORAL NIGHTLY
Qty: 90 TABLET | Refills: 1 | Status: SHIPPED | OUTPATIENT
Start: 2023-02-17

## 2023-03-16 ENCOUNTER — TELEPHONE (OUTPATIENT)
Dept: FAMILY MEDICINE CLINIC | Age: 63
End: 2023-03-16

## 2023-03-23 ENCOUNTER — TELEPHONE (OUTPATIENT)
Dept: FAMILY MEDICINE CLINIC | Age: 63
End: 2023-03-23

## 2023-03-23 NOTE — TELEPHONE ENCOUNTER
Spoke with pt as she missed AWV visit today. Pt stated she was uncomfortable with leaving her mom alone as she has been falling and is 80years old. Advised pt to call back and reschedule when she is available.

## 2023-04-03 RX ORDER — PROPRANOLOL HYDROCHLORIDE 20 MG/1
TABLET ORAL
Qty: 180 TABLET | Refills: 1 | Status: SHIPPED | OUTPATIENT
Start: 2023-04-03

## 2023-04-07 RX ORDER — GLIPIZIDE 5 MG/1
TABLET ORAL
Qty: 360 TABLET | Refills: 1 | Status: SHIPPED | OUTPATIENT
Start: 2023-04-07

## 2023-04-07 RX ORDER — EMPAGLIFLOZIN 10 MG/1
TABLET, FILM COATED ORAL
Qty: 90 TABLET | Refills: 1 | Status: SHIPPED | OUTPATIENT
Start: 2023-04-07

## 2023-04-16 DIAGNOSIS — R05.3 CHRONIC COUGH: ICD-10-CM

## 2023-04-17 RX ORDER — FAMOTIDINE 20 MG/1
TABLET, FILM COATED ORAL
Qty: 180 TABLET | Refills: 0 | Status: SHIPPED | OUTPATIENT
Start: 2023-04-17

## 2023-05-25 RX ORDER — ROSUVASTATIN CALCIUM 20 MG/1
TABLET, COATED ORAL
Qty: 90 TABLET | Refills: 1 | Status: SHIPPED | OUTPATIENT
Start: 2023-05-25

## 2023-06-10 ENCOUNTER — APPOINTMENT (OUTPATIENT)
Dept: CT IMAGING | Age: 63
End: 2023-06-10
Payer: MEDICARE

## 2023-06-10 ENCOUNTER — HOSPITAL ENCOUNTER (EMERGENCY)
Age: 63
Discharge: HOME | End: 2023-06-10
Attending: EMERGENCY MEDICINE
Payer: MEDICARE

## 2023-06-10 VITALS
BODY MASS INDEX: 34.53 KG/M2 | WEIGHT: 220 LBS | RESPIRATION RATE: 16 BRPM | OXYGEN SATURATION: 92 % | DIASTOLIC BLOOD PRESSURE: 85 MMHG | SYSTOLIC BLOOD PRESSURE: 180 MMHG | HEART RATE: 85 BPM | TEMPERATURE: 97.2 F | HEIGHT: 67 IN

## 2023-06-10 DIAGNOSIS — N13.9 OBSTRUCTIVE UROPATHY: Primary | ICD-10-CM

## 2023-06-10 LAB
ALBUMIN SERPL-MCNC: 4.7 G/DL (ref 3.4–5)
ALP SERPL-CCNC: 106 U/L (ref 40–129)
ALT SERPL-CCNC: 15 U/L (ref 10–40)
ANION GAP SERPL CALCULATED.3IONS-SCNC: 22 MMOL/L (ref 3–16)
AST SERPL-CCNC: 13 U/L (ref 15–37)
BACTERIA URNS QL MICRO: ABNORMAL /HPF
BASOPHILS # BLD: 0.1 K/UL (ref 0–0.2)
BASOPHILS NFR BLD: 0.6 %
BILIRUB DIRECT SERPL-MCNC: <0.2 MG/DL (ref 0–0.3)
BILIRUB INDIRECT SERPL-MCNC: ABNORMAL MG/DL (ref 0–1)
BILIRUB SERPL-MCNC: 0.3 MG/DL (ref 0–1)
BILIRUB UR QL STRIP.AUTO: NEGATIVE
BUN SERPL-MCNC: 24 MG/DL (ref 7–20)
CALCIUM SERPL-MCNC: 10 MG/DL (ref 8.3–10.6)
CHLORIDE SERPL-SCNC: 102 MMOL/L (ref 99–110)
CLARITY UR: CLEAR
CO2 SERPL-SCNC: 17 MMOL/L (ref 21–32)
COLOR UR: YELLOW
CREAT SERPL-MCNC: 1.3 MG/DL (ref 0.6–1.2)
DEPRECATED RDW RBC AUTO: 17.1 % (ref 12.4–15.4)
EOSINOPHIL # BLD: 0.2 K/UL (ref 0–0.6)
EOSINOPHIL NFR BLD: 1.2 %
EPI CELLS #/AREA URNS AUTO: 0 /HPF (ref 0–5)
GFR SERPLBLD CREATININE-BSD FMLA CKD-EPI: 46 ML/MIN/{1.73_M2}
GLUCOSE SERPL-MCNC: 250 MG/DL (ref 70–99)
GLUCOSE UR STRIP.AUTO-MCNC: >=1000 MG/DL
HCT VFR BLD AUTO: 35.7 % (ref 36–48)
HGB BLD-MCNC: 11.4 G/DL (ref 12–16)
HGB UR QL STRIP.AUTO: ABNORMAL
HYALINE CASTS #/AREA URNS AUTO: 0 /LPF (ref 0–8)
KETONES UR STRIP.AUTO-MCNC: ABNORMAL MG/DL
LEUKOCYTE ESTERASE UR QL STRIP.AUTO: NEGATIVE
LIPASE SERPL-CCNC: 38 U/L (ref 13–60)
LYMPHOCYTES # BLD: 2 K/UL (ref 1–5.1)
LYMPHOCYTES NFR BLD: 15 %
MCH RBC QN AUTO: 25.7 PG (ref 26–34)
MCHC RBC AUTO-ENTMCNC: 32.1 G/DL (ref 31–36)
MCV RBC AUTO: 80 FL (ref 80–100)
MONOCYTES # BLD: 0.9 K/UL (ref 0–1.3)
MONOCYTES NFR BLD: 6.4 %
NEUTROPHILS # BLD: 10.5 K/UL (ref 1.7–7.7)
NEUTROPHILS NFR BLD: 76.8 %
NITRITE UR QL STRIP.AUTO: NEGATIVE
PH UR STRIP.AUTO: 5.5 [PH] (ref 5–8)
PLATELET # BLD AUTO: 440 K/UL (ref 135–450)
PMV BLD AUTO: 7.1 FL (ref 5–10.5)
POTASSIUM SERPL-SCNC: 4.5 MMOL/L (ref 3.5–5.1)
PROT SERPL-MCNC: 8.3 G/DL (ref 6.4–8.2)
PROT UR STRIP.AUTO-MCNC: 30 MG/DL
RBC # BLD AUTO: 4.46 M/UL (ref 4–5.2)
RBC CLUMPS #/AREA URNS AUTO: 10 /HPF (ref 0–4)
SODIUM SERPL-SCNC: 141 MMOL/L (ref 136–145)
SP GR UR STRIP.AUTO: 1.03 (ref 1–1.03)
UA COMPLETE W REFLEX CULTURE PNL UR: ABNORMAL
UA DIPSTICK W REFLEX MICRO PNL UR: YES
URN SPEC COLLECT METH UR: ABNORMAL
UROBILINOGEN UR STRIP-ACNC: 0.2 E.U./DL
WBC # BLD AUTO: 13.6 K/UL (ref 4–11)
WBC #/AREA URNS AUTO: 1 /HPF (ref 0–5)

## 2023-06-10 PROCEDURE — 81001 URINALYSIS AUTO W/SCOPE: CPT

## 2023-06-10 PROCEDURE — 80076 HEPATIC FUNCTION PANEL: CPT

## 2023-06-10 PROCEDURE — 72131 CT LUMBAR SPINE W/O DYE: CPT

## 2023-06-10 PROCEDURE — 6360000002 HC RX W HCPCS: Performed by: EMERGENCY MEDICINE

## 2023-06-10 PROCEDURE — 74176 CT ABD & PELVIS W/O CONTRAST: CPT

## 2023-06-10 PROCEDURE — 80048 BASIC METABOLIC PNL TOTAL CA: CPT

## 2023-06-10 PROCEDURE — 83690 ASSAY OF LIPASE: CPT

## 2023-06-10 PROCEDURE — 2580000003 HC RX 258: Performed by: EMERGENCY MEDICINE

## 2023-06-10 PROCEDURE — 85025 COMPLETE CBC W/AUTO DIFF WBC: CPT

## 2023-06-10 RX ORDER — TAMSULOSIN HYDROCHLORIDE 0.4 MG/1
0.4 CAPSULE ORAL DAILY
Qty: 10 CAPSULE | Refills: 0 | Status: SHIPPED | OUTPATIENT
Start: 2023-06-10 | End: 2023-06-20

## 2023-06-10 RX ORDER — KETOROLAC TROMETHAMINE 10 MG/1
10 TABLET, FILM COATED ORAL EVERY 6 HOURS PRN
Qty: 20 TABLET | Refills: 0 | Status: SHIPPED | OUTPATIENT
Start: 2023-06-10

## 2023-06-10 RX ORDER — KETOROLAC TROMETHAMINE 30 MG/ML
30 INJECTION, SOLUTION INTRAMUSCULAR; INTRAVENOUS ONCE
Status: COMPLETED | OUTPATIENT
Start: 2023-06-10 | End: 2023-06-10

## 2023-06-10 RX ORDER — 0.9 % SODIUM CHLORIDE 0.9 %
1000 INTRAVENOUS SOLUTION INTRAVENOUS ONCE
Status: COMPLETED | OUTPATIENT
Start: 2023-06-10 | End: 2023-06-10

## 2023-06-10 RX ADMIN — SODIUM CHLORIDE 1000 ML: 9 INJECTION, SOLUTION INTRAVENOUS at 03:56

## 2023-06-10 RX ADMIN — SODIUM CHLORIDE 1000 ML: 9 INJECTION, SOLUTION INTRAVENOUS at 04:17

## 2023-06-10 RX ADMIN — KETOROLAC TROMETHAMINE 30 MG: 30 INJECTION, SOLUTION INTRAMUSCULAR; INTRAVENOUS at 02:56

## 2023-06-10 ASSESSMENT — PAIN DESCRIPTION - LOCATION
LOCATION: BACK

## 2023-06-10 ASSESSMENT — PAIN DESCRIPTION - FREQUENCY: FREQUENCY: INTERMITTENT

## 2023-06-10 ASSESSMENT — PAIN DESCRIPTION - DESCRIPTORS
DESCRIPTORS: THROBBING
DESCRIPTORS: THROBBING

## 2023-06-10 ASSESSMENT — ENCOUNTER SYMPTOMS
EYE DISCHARGE: 0
COLOR CHANGE: 0
EYE ITCHING: 0
VOMITING: 0
COUGH: 0
CONSTIPATION: 0
SHORTNESS OF BREATH: 0
ABDOMINAL PAIN: 0

## 2023-06-10 ASSESSMENT — PAIN SCALES - GENERAL
PAINLEVEL_OUTOF10: 1
PAINLEVEL_OUTOF10: 10
PAINLEVEL_OUTOF10: 10
PAINLEVEL_OUTOF10: 3

## 2023-06-10 ASSESSMENT — PAIN DESCRIPTION - ORIENTATION
ORIENTATION: LEFT;LOWER
ORIENTATION: LEFT;LOWER

## 2023-06-10 ASSESSMENT — LIFESTYLE VARIABLES
HOW MANY STANDARD DRINKS CONTAINING ALCOHOL DO YOU HAVE ON A TYPICAL DAY: PATIENT DOES NOT DRINK
HOW OFTEN DO YOU HAVE A DRINK CONTAINING ALCOHOL: NEVER

## 2023-06-10 ASSESSMENT — PAIN - FUNCTIONAL ASSESSMENT
PAIN_FUNCTIONAL_ASSESSMENT: 0-10
PAIN_FUNCTIONAL_ASSESSMENT: 0-10

## 2023-06-10 NOTE — ED PROVIDER NOTES
Past Medical History:   Diagnosis Date    Acid reflux     Cancer ([facility]     SKIN    Chronic cough     Depression     Diabetes mellitus ([facility]     Hyperlipidemia     Irregular heart beat     Pre-diabetes     Skin cancer        SURGICAL HISTORY       Past Surgical History:   Procedure Laterality Date    COLONOSCOPY  12/29/2008    normal dr Tony Sung, check in 7-10 years    COLONOSCOPY  05/31/2017    dr Tony Sung polyp repeat in 5 years    COLONOSCOPY N/A 11/15/2022    COLONOSCOPY POLYPECTOMY SNARE/COLD BIOPSY performed by Gen Hernandez MD at [address] 11/15/2022    ESOPHAGEAL DILATION Deajhonny Palmer performed by Gen Hernandez MD at [address] Bilateral     legs, pins in legs removed.     HAND SURGERY Left     tendon repair    HYSTERECTOMY (CERVIX STATUS UNKNOWN)  2003    complete    PAIN MANAGEMENT PROCEDURE Right 01/06/2020    RIGHT L4 AND L5 TRANSFORAMINAL EPIDURAL STEROID INJECTION WITH FLUOROSCOPY (47751, [id number]) performed by Denise Reina MD at [place] [address]  01/30/2004    dr Tony Sung    UPPER GASTROINTESTINAL ENDOSCOPY  12/05/2014    gastric ulcers, dr Tony Sung    UPPER GASTROINTESTINAL ENDOSCOPY  05/31/2017    gastritis no ulcers dr Tony Sung    UPPER GASTROINTESTINAL ENDOSCOPY N/A 11/15/2022    EGD BIOPSY performed by Gen Hernandez MD at [address] [facility]       Previous Medications    ALBUTEROL SULFATE HFA (PROAIR HFA) 108 (90 BASE) MCG/ACT INHALER    Inhale 2 puffs into the lungs every 6 hours as needed for Wheezing or Shortness of Breath    ALBUTEROL SULFATE HFA (VENTOLIN HFA) 108 (90 BASE) MCG/ACT INHALER    INHALE TWO PUFFS BY MOUTH FOUR TIMES A DAY AS NEEDED FOR WHEEZING OR COUGH    AZELASTINE (ASTELIN) 0.1 % NASAL SPRAY    SPRAY 2 SPRAYS IN EACH NOSTRIL DAILY AS DIRECTED    BLOOD GLUCOSE MONITOR KIT AND SUPPLIES    Test one times a day & as needed for

## 2023-06-17 DIAGNOSIS — E11.65 UNCONTROLLED TYPE 2 DIABETES MELLITUS WITH HYPERGLYCEMIA (HCC): ICD-10-CM

## 2023-06-19 RX ORDER — INSULIN GLARGINE 100 [IU]/ML
INJECTION, SOLUTION SUBCUTANEOUS
Qty: 15 ML | Refills: 4 | OUTPATIENT
Start: 2023-06-19

## 2023-07-11 ENCOUNTER — OFFICE VISIT (OUTPATIENT)
Dept: FAMILY MEDICINE CLINIC | Age: 63
End: 2023-07-11
Payer: MEDICARE

## 2023-07-11 VITALS
SYSTOLIC BLOOD PRESSURE: 128 MMHG | TEMPERATURE: 97.1 F | WEIGHT: 219 LBS | DIASTOLIC BLOOD PRESSURE: 78 MMHG | HEART RATE: 76 BPM | BODY MASS INDEX: 34.37 KG/M2 | HEIGHT: 67 IN

## 2023-07-11 DIAGNOSIS — E11.65 POORLY CONTROLLED DIABETES MELLITUS (HCC): ICD-10-CM

## 2023-07-11 DIAGNOSIS — E11.65 POORLY CONTROLLED DIABETES MELLITUS (HCC): Primary | ICD-10-CM

## 2023-07-11 DIAGNOSIS — R79.89 ABNORMAL CBC: ICD-10-CM

## 2023-07-11 LAB
ANION GAP SERPL CALCULATED.3IONS-SCNC: 15 MMOL/L (ref 3–16)
BASOPHILS # BLD: 0.1 K/UL (ref 0–0.2)
BASOPHILS NFR BLD: 0.7 %
BUN SERPL-MCNC: 22 MG/DL (ref 7–20)
CALCIUM SERPL-MCNC: 10 MG/DL (ref 8.3–10.6)
CHLORIDE SERPL-SCNC: 102 MMOL/L (ref 99–110)
CHOLEST SERPL-MCNC: 177 MG/DL (ref 0–199)
CO2 SERPL-SCNC: 21 MMOL/L (ref 21–32)
CREAT SERPL-MCNC: 1 MG/DL (ref 0.6–1.2)
DEPRECATED RDW RBC AUTO: 17 % (ref 12.4–15.4)
EOSINOPHIL # BLD: 0.2 K/UL (ref 0–0.6)
EOSINOPHIL NFR BLD: 2.4 %
FOLATE SERPL-MCNC: 6.02 NG/ML (ref 4.78–24.2)
GFR SERPLBLD CREATININE-BSD FMLA CKD-EPI: >60 ML/MIN/{1.73_M2}
GLUCOSE SERPL-MCNC: 215 MG/DL (ref 70–99)
HCT VFR BLD AUTO: 33.4 % (ref 36–48)
HGB BLD-MCNC: 10.8 G/DL (ref 12–16)
LYMPHOCYTES # BLD: 2.1 K/UL (ref 1–5.1)
LYMPHOCYTES NFR BLD: 24 %
MCH RBC QN AUTO: 26.1 PG (ref 26–34)
MCHC RBC AUTO-ENTMCNC: 32.2 G/DL (ref 31–36)
MCV RBC AUTO: 81.2 FL (ref 80–100)
MONOCYTES # BLD: 0.6 K/UL (ref 0–1.3)
MONOCYTES NFR BLD: 6.9 %
NEUTROPHILS # BLD: 5.7 K/UL (ref 1.7–7.7)
NEUTROPHILS NFR BLD: 66 %
PLATELET # BLD AUTO: 361 K/UL (ref 135–450)
PMV BLD AUTO: 7.5 FL (ref 5–10.5)
POTASSIUM SERPL-SCNC: 4.5 MMOL/L (ref 3.5–5.1)
RBC # BLD AUTO: 4.12 M/UL (ref 4–5.2)
SODIUM SERPL-SCNC: 138 MMOL/L (ref 136–145)
VIT B12 SERPL-MCNC: 431 PG/ML (ref 211–911)
WBC # BLD AUTO: 8.7 K/UL (ref 4–11)

## 2023-07-11 PROCEDURE — 1036F TOBACCO NON-USER: CPT | Performed by: FAMILY MEDICINE

## 2023-07-11 PROCEDURE — 99214 OFFICE O/P EST MOD 30 MIN: CPT | Performed by: FAMILY MEDICINE

## 2023-07-11 PROCEDURE — G8417 CALC BMI ABV UP PARAM F/U: HCPCS | Performed by: FAMILY MEDICINE

## 2023-07-11 PROCEDURE — G8427 DOCREV CUR MEDS BY ELIG CLIN: HCPCS | Performed by: FAMILY MEDICINE

## 2023-07-11 PROCEDURE — 3052F HG A1C>EQUAL 8.0%<EQUAL 9.0%: CPT | Performed by: FAMILY MEDICINE

## 2023-07-11 PROCEDURE — 3017F COLORECTAL CA SCREEN DOC REV: CPT | Performed by: FAMILY MEDICINE

## 2023-07-11 PROCEDURE — 2022F DILAT RTA XM EVC RTNOPTHY: CPT | Performed by: FAMILY MEDICINE

## 2023-07-11 NOTE — PROGRESS NOTES
Subjective:      Patient ID: Socrates Swanson is a 58 y.o. female. Chief Complaint   Patient presents with    Follow-up     Diabetes        Patient presents with: Follow-up: Diabetes    She is here for the above  She had a recent kidney stone  She has seen endocrine but not for a number of months     Glucose      No fever  Abdomen ok now  No urine sx at this time  No cp no sob    YOB: 1960    Date of Visit:  7/11/2023     -- Ampicillin -- Rash, Hives, Shortness Of Breath                            and Swelling    --  Tongue and throat swelling, thinks hear and lungs             affected. -- Doxycycline -- Hives, Shortness Of Breath and                            Swelling    --  Tongue and throat swelling, thinks hear and lungs             affected.    -- Bee Venom -- Swelling   -- Cholestatin -- Other (See Comments)    --  Dust mites   -- Doxycycline Calcium    -- Doxycycline Monohydrate -- Swelling   -- Vibramycin [Doxycycline Calcium]    -- Cefuroxime Axetil -- Rash, Hives and Swelling    --  burning    Current Outpatient Medications:  rosuvastatin (CRESTOR) 20 MG tablet, TAKE 1 TABLET BY MOUTH EVERY NIGHT, Disp: 90 tablet, Rfl: 1  sertraline (ZOLOFT) 50 MG tablet, TAKE 1 TABLET BY MOUTH EVERY DAY, Disp: 90 tablet, Rfl: 1  famotidine (PEPCID) 20 MG tablet, TAKE 1 TABLET BY MOUTH TWICE DAILY, Disp: 180 tablet, Rfl: 0  glipiZIDE (GLUCOTROL) 5 MG tablet, TAKE 2 TABLETS BY MOUTH TWICE DAILY BEFORE MEALS, Disp: 360 tablet, Rfl: 1  JARDIANCE 10 MG tablet, TAKE 1 TABLET BY MOUTH DAILY, Disp: 90 tablet, Rfl: 1  propranolol (INDERAL) 20 MG tablet, TAKE 1 TABLET BY MOUTH TWICE DAILY, Disp: 180 tablet, Rfl: 1  metFORMIN (GLUCOPHAGE) 500 MG tablet, TAKE 2 TABLETS BY MOUTH TWICE DAILY WITH MEALS, Disp: 360 tablet, Rfl: 1  fluticasone (FLONASE) 50 MCG/ACT nasal spray, SPRAY ONCE IN EACH NOSTRIL TWICE DAILY, Disp: 16 g, Rfl: 11  Insulin Pen Needle (B-D UF III MINI PEN NEEDLES) 31G X 5 MM MISC, 1 each

## 2023-07-12 DIAGNOSIS — D64.9 ANEMIA, UNSPECIFIED TYPE: Primary | ICD-10-CM

## 2023-07-12 LAB
EST. AVERAGE GLUCOSE BLD GHB EST-MCNC: 188.6 MG/DL
HBA1C MFR BLD: 8.2 %

## 2023-07-13 ENCOUNTER — TELEPHONE (OUTPATIENT)
Dept: FAMILY MEDICINE CLINIC | Age: 63
End: 2023-07-13

## 2023-07-13 NOTE — TELEPHONE ENCOUNTER
Ozempic is same type of medicine as trulicity and she did not do well with that  Is she sure she wants to try?     See me in 2 months after starting  If n or abd pain stop and call me  Call for dose change after the first 4 weeks   It can cause pancreatitis

## 2023-07-13 NOTE — TELEPHONE ENCOUNTER
I will send rybelsus in  For now continue the other medicines  However we will likely go down on her glipizide in the future     Call me in 4 week to discuss and go to the next dose if she is tolerating it well   Abdomen pain or n and v stop the med and call me  See me in 3 months     Med sent    Orders Placed This Encounter   Medications    Semaglutide 3 MG TABS     Sig: Take 3 mg by mouth daily (with breakfast) Call for new dose when refil due     Dispense:  30 tablet     Refill:  0

## 2023-07-13 NOTE — TELEPHONE ENCOUNTER
Patient is asking for prescription for Ozempic to be sent to  [facility].     Please call, [phone]

## 2023-07-13 NOTE — TELEPHONE ENCOUNTER
Blanche Palacio advised and verbalized understanding. She states she has to do something and thought it was a pill form? She is asking when she does start it can she be taken off any medication?

## 2023-07-14 DIAGNOSIS — R05.3 CHRONIC COUGH: ICD-10-CM

## 2023-07-14 RX ORDER — FAMOTIDINE 20 MG/1
TABLET, FILM COATED ORAL
Qty: 180 TABLET | Refills: 0 | Status: SHIPPED | OUTPATIENT
Start: 2023-07-14

## 2023-07-18 ENCOUNTER — TELEPHONE (OUTPATIENT)
Dept: FAMILY MEDICINE CLINIC | Age: 63
End: 2023-07-18

## 2023-07-18 NOTE — TELEPHONE ENCOUNTER
We are in process of reviewing that  I ordered the iron test did she do that  That result will help decide what we need to do

## 2023-07-18 NOTE — TELEPHONE ENCOUNTER
Patient wants to know what she should take to help with anemia? [facility] on [address]

## 2023-08-04 ENCOUNTER — TELEPHONE (OUTPATIENT)
Dept: FAMILY MEDICINE CLINIC | Age: 63
End: 2023-08-04

## 2023-08-18 ENCOUNTER — TELEMEDICINE (OUTPATIENT)
Dept: ENDOCRINOLOGY | Age: 63
End: 2023-08-18
Payer: MEDICARE

## 2023-08-18 DIAGNOSIS — E11.65 UNCONTROLLED TYPE 2 DIABETES MELLITUS WITH HYPERGLYCEMIA (HCC): Primary | ICD-10-CM

## 2023-08-18 PROCEDURE — 2022F DILAT RTA XM EVC RTNOPTHY: CPT | Performed by: INTERNAL MEDICINE

## 2023-08-18 PROCEDURE — 99214 OFFICE O/P EST MOD 30 MIN: CPT | Performed by: INTERNAL MEDICINE

## 2023-08-18 PROCEDURE — 3052F HG A1C>EQUAL 8.0%<EQUAL 9.0%: CPT | Performed by: INTERNAL MEDICINE

## 2023-08-18 PROCEDURE — 3017F COLORECTAL CA SCREEN DOC REV: CPT | Performed by: INTERNAL MEDICINE

## 2023-08-18 PROCEDURE — G8427 DOCREV CUR MEDS BY ELIG CLIN: HCPCS | Performed by: INTERNAL MEDICINE

## 2023-08-18 NOTE — PROGRESS NOTES
Seen as patient for diabetes    Christiano Armendariz, was evaluated through a synchronous (real-time) audio-video encounter. The patient (or guardian if applicable) is aware that this is a billable service, which includes applicable co-pays. This Virtual Visit was conducted with patient's (and/or legal guardian's) consent. Patient identification was verified, and a caregiver was present when appropriate. The patient was located at home  Provider was located at home    Interim:    Seen after 11/22  Did not tolerate Rybelsus    Diagnosed with Type 2 diabetes mellitus > 10 years    Known diabetic complications: Retinopathy  Uncontrolled, moderate    Current diabetic medications     Jardiance 10mg  Metformin 1gm BID  Glipizide 10mg BID  Lantus  46 units      Trulicity 9.9WU stopped due to side effects  Intolerance Rybelsus    H/o januvia    Last A1c  8.2%<-----8.8%<------8.4%<-----9.4%<------ 8.9<--- 9.6<---- 9.3    Prior visit with dietician: no  Current diet: on average, 3 meals per day   Current exercise: walking   Current monitoring regimen: home blood tests -  1/day    Has brought blood glucose log/meter: No   Home blood sugar records:109-154  Any episodes of hypoglycemia?    Worsened by high CHO    No Hx of CAD , PVD, CVA    Hyperlipidemia:   For   Years  Takes simvastatin 40mg  Controlled  LDL 57   on 8/20    Last eye exam: 2/22  Last foot exam: 7/22  Last microalbumin to creatinine ratio: 8/19---> 145 On 7/22 had cough with lisinopril    Does report nausea, vomiting after eating  For 3-4 months  Sees GI  She was on trulicity since 0/53    She had episode of vomiting in the office    Past Medical History:   Diagnosis Date    Acid reflux     Cancer ([address])     SKIN    Chronic cough     Depression     Diabetes mellitus ([address])     Hyperlipidemia     Irregular heart beat     Pre-diabetes     Skin cancer      Past Surgical History:   Procedure Laterality Date    COLONOSCOPY  12/29/2008    normal dr Justin Trevino, check

## 2023-08-27 DIAGNOSIS — E11.65 UNCONTROLLED TYPE 2 DIABETES MELLITUS WITH HYPERGLYCEMIA (HCC): ICD-10-CM

## 2023-08-28 RX ORDER — BLOOD SUGAR DIAGNOSTIC
STRIP MISCELLANEOUS
Qty: 50 STRIP | Refills: 5 | Status: SHIPPED | OUTPATIENT
Start: 2023-08-28

## 2023-08-28 NOTE — TELEPHONE ENCOUNTER
Medication:   Requested Prescriptions     Pending Prescriptions Disp Refills    blood glucose test strips (ACCU-CHEK GUIDE) strip [Pharmacy Med Name: [facility] TEST STRIPS 50] 50 strip 5     Sig: USE TO TEST ONE TIME A DAY       Last Filled:      Patient Phone Number: [phone] (home)     Last appt: 8/18/2023   Next appt: 12/21/2023    Last Labs DM:   Lab Results   Component Value Date/Time    LABA1C 8.2 07/11/2023 03:17 PM

## 2023-09-05 DIAGNOSIS — E11.65 UNCONTROLLED TYPE 2 DIABETES MELLITUS WITH HYPERGLYCEMIA (HCC): ICD-10-CM

## 2023-09-05 RX ORDER — INSULIN GLARGINE 100 [IU]/ML
INJECTION, SOLUTION SUBCUTANEOUS
Qty: 15 ML | Refills: 3 | Status: SHIPPED | OUTPATIENT
Start: 2023-09-05

## 2023-09-05 NOTE — TELEPHONE ENCOUNTER
Medication:   Requested Prescriptions     Pending Prescriptions Disp Refills    LANTUS SOLOSTAR 100 UNIT/ML injection pen [Pharmacy Med Name: LANTUS SOLOSTAR PEN INJ 3ML] 15 mL 3     Sig: ADMINISTER 24 TO 34 UNITS UNDER THE SKIN EVERY NIGHT AT BEDTIME       Last Filled:      Patient Phone Number: [phone] (home)     Last appt: 8/18/2023   Next appt: 12/21/2023    Last Labs DM:   Lab Results   Component Value Date/Time    LABA1C 8.2 07/11/2023 03:17 PM

## 2023-09-07 DIAGNOSIS — R05.3 CHRONIC COUGH: ICD-10-CM

## 2023-09-07 RX ORDER — FAMOTIDINE 20 MG/1
TABLET, FILM COATED ORAL
Qty: 180 TABLET | Refills: 0 | Status: SHIPPED | OUTPATIENT
Start: 2023-09-07

## (undated) DEVICE — FORCEPS BX 240CM 2.4MM L NDL RAD JAW 4 M00513334

## (undated) DEVICE — SNARE ENDOSCP W10MMXL240CM SHTH DIA24MM RND INSUL STIFF